# Patient Record
Sex: MALE | Race: WHITE | NOT HISPANIC OR LATINO | Employment: FULL TIME | URBAN - METROPOLITAN AREA
[De-identification: names, ages, dates, MRNs, and addresses within clinical notes are randomized per-mention and may not be internally consistent; named-entity substitution may affect disease eponyms.]

---

## 2018-01-18 ENCOUNTER — OFFICE VISIT (OUTPATIENT)
Dept: FAMILY MEDICINE | Facility: CLINIC | Age: 38
End: 2018-01-18
Payer: COMMERCIAL

## 2018-01-18 VITALS
HEART RATE: 70 BPM | SYSTOLIC BLOOD PRESSURE: 114 MMHG | DIASTOLIC BLOOD PRESSURE: 78 MMHG | WEIGHT: 197 LBS | HEIGHT: 74 IN | OXYGEN SATURATION: 98 % | BODY MASS INDEX: 25.28 KG/M2 | TEMPERATURE: 98.6 F

## 2018-01-18 DIAGNOSIS — J02.9 SORE THROAT: ICD-10-CM

## 2018-01-18 DIAGNOSIS — H65.192 OTHER ACUTE NONSUPPURATIVE OTITIS MEDIA OF LEFT EAR, RECURRENCE NOT SPECIFIED: Primary | ICD-10-CM

## 2018-01-18 DIAGNOSIS — J06.9 VIRAL URI WITH COUGH: ICD-10-CM

## 2018-01-18 LAB
DEPRECATED S PYO AG THROAT QL EIA: NORMAL
SPECIMEN SOURCE: NORMAL

## 2018-01-18 PROCEDURE — 87081 CULTURE SCREEN ONLY: CPT | Performed by: FAMILY MEDICINE

## 2018-01-18 PROCEDURE — 87880 STREP A ASSAY W/OPTIC: CPT | Performed by: FAMILY MEDICINE

## 2018-01-18 PROCEDURE — 99203 OFFICE O/P NEW LOW 30 MIN: CPT | Performed by: FAMILY MEDICINE

## 2018-01-18 RX ORDER — AZITHROMYCIN 250 MG/1
TABLET, FILM COATED ORAL
Qty: 6 TABLET | Refills: 0 | Status: SHIPPED | OUTPATIENT
Start: 2018-01-18 | End: 2018-05-31

## 2018-01-18 NOTE — PROGRESS NOTES
SUBJECTIVE:                                                    Fer Santacruz is a 37 year old male who presents to clinic today for the following health issues: new patient to our clinic. No ac    Acute Illness   Acute illness concerns: sore throat  Onset: 5 days     Fever: no    Chills/Sweats: no    Headache (location?): YES- bilateral forehead area.     Sinus Pressure:YES- slight    Conjunctivitis:  no    Ear Pain: YES: bilateral and feel plugged.     Rhinorrhea: no    Congestion: YES - nasal productive of sputum with yellow-brown mucus.     Sore Throat: YES     Cough: YES-productive of brown yellow sputum- improving , but present throughout the day.     Wheeze: no    Decreased Appetite: no    Nausea: no    Vomiting: no    Diarrhea:  no    Dysuria/Freq.: no    Fatigue/Achiness: no    Sick/Strep Exposure: no    Took advil pm last night and feels a little better today.      Therapies Tried and outcome: aleve this am , advil            Allergies   Allergen Reactions     Penicillins Rash     Health Maintenance   Topic Date Due     LIPID SCREEN Q5 YR MALE (SYSTEM ASSIGNED)  04/03/2017     TETANUS IMMUNIZATION (SYSTEM ASSIGNED)  11/03/2018     INFLUENZA VACCINE (SYSTEM ASSIGNED)  Completed       Patient Active Problem List   Diagnosis     NO ACTIVE PROBLEMS       Past Medical History:   Diagnosis Date     NO ACTIVE PROBLEMS        Past Surgical History:   Procedure Laterality Date     ARTHROSCOPY SHOULDER DISTAL CLAVICLE REPAIR Left     left shoulder - AC joint       wisdom teeth         Social History     Social History     Marital status:      Spouse name: Gracie      Number of children: 1     Years of education: N/A     Occupational History            [employer]      Social History Main Topics     Smoking status: Former Smoker     Quit date: 1/18/2006     Smokeless tobacco: Never Used     Alcohol use Yes      Comment: occassional     Drug use: No     Sexual activity: Yes     Partners: Female  "     Comment:  - Gracie      Other Topics Concern     Not on file     Social History Narrative     No narrative on file       History reviewed. No pertinent family history.  No current outpatient prescriptions on file.        Allergies   Allergen Reactions     Penicillins Rash         Problem list and histories reviewed & adjusted, as indicated.  Additional history: as documented    Reviewed and updated as needed this visit by clinical staffTobacco  Allergies  Meds  Med Hx  Surg Hx  Fam Hx  Soc Hx      Reviewed and updated as needed this visit by Provider         ROS:   ROS: 12 point ROS neg other than the symptoms noted above    OBJECTIVE:                                                    /78 (BP Location: Right arm, Patient Position: Chair, Cuff Size: Adult Large)  Pulse 70  Temp 98.6  F (37  C) (Oral)  Ht 6' 2\" (1.88 m)  Wt 197 lb (89.4 kg)  SpO2 98%  BMI 25.29 kg/m2  Body mass index is 25.29 kg/(m^2).   GENERAL: healthy, alert, well nourished, well hydrated, no distress  HENT: ear canals- normal;left  TM is-red , dull and bulging, the rightTM  is  normal; Nose- congested, sinuses = nontender;  Mouth- no ulcers, no lesions, throat = a bit red posteriorly.   NECK: no tenderness, no adenopathy, no asymmetry, no masses, no stiffness; thyroid- normal to palpation  RESP: lungs clear to auscultation - no rales, no rhonchi, no wheezes  CV: regular rates and rhythm, normal S1 S2, no S3 or S4 and no murmur, no click or rub -  ABDOMEN: soft, no tenderness, no  hepatosplenomegaly, no masses, normal bowel sounds  MS: extremities- no gross deformities noted, no edema.     Diagnostic test results:  Results for orders placed or performed in visit on 01/18/18 (from the past 24 hour(s))   Strep, Rapid Screen   Result Value Ref Range    Specimen Description Throat     Rapid Strep A Screen       NEGATIVE: No Group A streptococcal antigen detected by immunoassay, await culture report.      "     ASSESSMENT/PLAN:                                                        ICD-10-CM    1. Other acute nonsuppurative otitis media of left ear, recurrence not specified H65.192 azithromycin (ZITHROMAX) 250 MG tablet   2. Sore throat J02.9 Strep, Rapid Screen     Beta strep group A culture   3. Viral URI with cough J06.9     B97.89      Please, call or return to clinic or go to the ER immediately if signs or symptoms worsen or fail to improve as anticipated.   See Patient Instructions.           Amee Sidhu MD  [facility]

## 2018-01-18 NOTE — MR AVS SNAPSHOT
After Visit Summary   1/18/2018    Fer Santacruz    MRN: [id number]           Patient Information     Date Of Birth          1980        Visit Information        Provider Department      1/18/2018 2:30 PM Amee Sidhu MD [facility] [place]        Today's Diagnoses     Other acute nonsuppurative otitis media of left ear, recurrence not specified    -  1    Sore throat        Viral URI with cough          Care Instructions                    Middle Ear Infection (Otitis Media)            What is a middle ear infection?   A middle ear infection is an infection of the space in the ear behind the eardrum. Anyone can get an ear infection, but ear infections are more common in children less than 8 years old.   How does it occur?   Ear infections usually begin with a viral infection of the nose and throat. For example, a cold might lead to an infection of the ear. Ear infections may also occur when you have allergies. The viral infection or allergic reaction can cause swelling of the tube between your ear and throat (the eustachian tube). The swelling may trap bacteria in your middle ear, resulting in a bacterial infection.   Pressure from the buildup of pus or fluid in the ear sometimes causes the eardrum to tear (rupture). The eardrum is a thin membrane that separates and protects the delicate parts of the middle ear from the air and moisture in the ear canal.   What are the symptoms?   You may have one or more of these symptoms:   earache   hearing loss   feeling of blockage in the ear   fever   dizziness.   How is it diagnosed?   Your healthcare provider will ask about your symptoms and look at your eardrum.   Your healthcare provider may use a special light to look into the ear canal and check for fluid in the ear. Your provider will look at how the eardrum moves when a puff of air is blown into the ear. If there is fluid behind the eardrum, the membrane will not move well.    How is it treated?   Antibiotic medicine is a common treatment for ear infections. However, recent studies have shown that the symptoms of ear infections often go away in a couple of days without antibiotics. Bacteria can become resistant to antibiotics, and the medicine may cause side effects. For these reasons, your healthcare provider may wait 1 to 3 days to see if the symptoms go away on their own before prescribing an antibiotic.   Your provider may recommend a decongestant (tablets or a nasal spray) to help clear the eustachian tube. This may help relieve pressure in the middle ear. For pain take a nonprescription pain reliever such as acetaminophen (Tylenol) or ibuprofen. Check with your healthcare provider before you give any medicine that contains aspirin or salicylates to a child or teen. This includes medicines like baby aspirin, some cold medicines, and Pepto Bismol. Children and teens who take aspirin are at risk for a serious illness called Reye's syndrome. Aspirin and ibuprofen are nonsteroidal anti-inflammatory medicines (NSAIDs). NSAIDs may cause stomach bleeding and other problems. These risks increase with age. Read the label and take as directed. Unless recommended by your healthcare provider, do not take NSAIDs for more than 10 days for any reason.   How long will the effects last?   In most cases you should feel better in 2 to 3 days.   If you are taking an antibiotic and your eardrum has not returned to normal when your provider examines you again, you may need to take a different antibiotic or other medicine. In this case, it may take another 1 to 2 weeks before your ear feels normal again.   What can I do to take care of myself?   Follow your healthcare provider's instructions.   If you are taking an antibiotic, take all of it according to the directions, even when the symptoms have gone away before you have finished it.   To help relieve pain, put a warm moist washcloth or a hot water  bottle over the ear.   If you have discharge from your ear, you can wipe it away with a washcloth and loosely plug the ear with cotton to catch further drainage. Discharge from the ear can mean that you have an infection of the ear canal or, if you have a lot of fluid and pus draining from your ear, you may have a ruptured eardrum. Ask your healthcare provider how to care for the ear if you have discharge. If the discharge is caused by a ruptured eardrum, then you will need to protect the ear from water. You will need one or more follow-up appointments to check the healing of your eardrum.   If you have a fever:   Rest until your temperature has fallen below 100?F (37.8?C). Then become as active as is comfortable.   Ask your provider if you can take aspirin, acetaminophen, or ibuprofen to control your fever.   Keep a daily record of your temperature.   Call your healthcare provider if you have:   a temperature of 101.5 degrees F (38.6 degrees C) or higher that persists even after you take acetaminophen, aspirin, or ibuprofen   a severe headache or worsening pain around the ear   swelling around the ear   increasing dizziness   worsening of hearing problems   weakness of one side of your face.   Keep all your appointments. Your healthcare provider may want you to have one or more follow-up exams until signs of inflammation and infection have disappeared.   How can I prevent an ear infection from occurring?   If you tend to get ear infections often, ask your healthcare provider if you need to be checked for allergies. Getting treatment for allergies may help prevent ear infections.   Ask if using decongestants when you have a cold may help prevent you from getting ear infections.         Published by [employer].  This content is reviewed periodically and is subject to change as new health information becomes available. The information is intended to inform and educate and is not a replacement for medical evaluation,  advice, diagnosis or treatment by a healthcare professional.   Developed by [employer].   ? 2010 [facility] and/or its affiliates. All Rights Reserved.   Copyright   Clinical Reference Systems 2011               Thank you for choosing [facility]  for your Health Care. It was a pleasure seeing you at your visit today. Please contact us with any questions or concerns you may have.                   Amee Sidhu MD                                  To reach your [facility] care team after hours call:   [phone]    Our clinic hours are:     Monday- 7:30 am - 7:00 pm                             Tuesday through Friday- 7:30 am - 5:00 pm                                        Saturday- 8:00 am - 12:00 pm                  Phone:  [phone]    Our pharmacy hours are:     Monday  8:00 am to 7:00 pm      Tuesday through Friday 8:00am to 6:00pm                        Saturday - 9:00 am to 1:00 pm      Sunday : Closed.              Phone:  [phone]      There is also information available at our web site:  [email or web address]    If your provider ordered any lab tests and you do not receive the results within 10 business days, please call the clinic.    If you need a medication refill please contact your pharmacy.  Please allow 2 business days for your refill to be completed.    Our clinic offers telephone visits and e visits.  Please ask one of your team members to explain more.      Use [employer] (secure email communication and access to your chart) to send your primary care provider a message or make an appointment. Ask someone on your Team how to sign up for [employer].                       Follow-ups after your visit        Who to contact     If you have questions or need follow up information about today's clinic visit or your schedule please contact [facility] directly at [phone].  Normal or non-critical lab and imaging results will be communicated  "to you by MyChart, letter or phone within 4 business days after the clinic has received the results. If you do not hear from us within 7 days, please contact the clinic through LiveAction or phone. If you have a critical or abnormal lab result, we will notify you by phone as soon as possible.  Submit refill requests through LiveAction or call your pharmacy and they will forward the refill request to us. Please allow 3 business days for your refill to be completed.          Additional Information About Your Visit        LiveAction Information     LiveAction lets you send messages to your doctor, view your test results, renew your prescriptions, schedule appointments and more. To sign up, go to [email or web address] . Click on \"Log in\" on the left side of the screen, which will take you to the Welcome page. Then click on \"Sign up Now\" on the right side of the page.     You will be asked to enter the access code listed below, as well as some personal information. Please follow the directions to create your username and password.     Your access code is: [email or web address]  Expires: 2018  3:23 PM     Your access code will  in 90 days. If you need help or a new code, please call your [place] clinic or [phone].        Care EveryWhere ID     This is your Care EveryWhere ID. This could be used by other organizations to access your [place] medical records  [id number]        Your Vitals Were     Pulse Temperature Height Pulse Oximetry BMI (Body Mass Index)       70 98.6  F (37  C) (Oral) 6' 2\" (1.88 m) 98% 25.29 kg/m2        Blood Pressure from Last 3 Encounters:   18 114/78    Weight from Last 3 Encounters:   18 197 lb (89.4 kg)              We Performed the Following     Beta strep group A culture     Strep, Rapid Screen          Today's Medication Changes          These changes are accurate as of: 18  3:23 PM.  If you have any questions, ask your nurse or doctor.               Start taking these " medicines.        Dose/Directions    azithromycin 250 MG tablet   Commonly known as:  ZITHROMAX   Used for:  Other acute nonsuppurative otitis media of left ear, recurrence not specified   Started by:  Amee Sidhu MD        2 tabs first day, then 1 tab by mouth daily for 4 additional days   Quantity:  6 tablet   Refills:  0            Where to get your medicines      These medications were sent to [facility]/pharmacy #[id number] - [place], [place] - [address]  [address] [place] [address]     Phone:  [phone]     azithromycin 250 MG tablet                Primary Care Provider    None Specified       No primary provider on file.        Equal Access to Services     [facility]: Hadii ileana Rushing, wajose g cano, qaybta kaalmada terrance, lisa hurd . So [facility] [phone].    ATENCIÓN: Si habla español, tiene a cannon disposición servicios gratuitos de asistencia lingüística. Ll[facility] [phone].    We comply with applicable federal civil rights laws and [place] laws. We do not discriminate on the basis of race, color, national origin, age, disability, sex, sexual orientation, or gender identity.            Thank you!     Thank you for choosing [facility]  for your care. Our goal is always to provide you with excellent care. Hearing back from our patients is one way we can continue to improve our services. Please take a few minutes to complete the written survey that you may receive in the mail after your visit with us. Thank you!             Your Updated Medication List - Protect others around you: Learn how to safely use, store and throw away your medicines at www.disposemymeds.org.          This list is accurate as of: 1/18/18  3:23 PM.  Always use your most recent med list.                   Brand Name Dispense Instructions for use Diagnosis    azithromycin 250 MG tablet    ZITHROMAX    6 tablet    2 tabs first day, then 1 tab by  mouth daily for 4 additional days    Other acute nonsuppurative otitis media of left ear, recurrence not specified

## 2018-01-18 NOTE — PATIENT INSTRUCTIONS
Middle Ear Infection (Otitis Media)            What is a middle ear infection?   A middle ear infection is an infection of the space in the ear behind the eardrum. Anyone can get an ear infection, but ear infections are more common in children less than 8 years old.   How does it occur?   Ear infections usually begin with a viral infection of the nose and throat. For example, a cold might lead to an infection of the ear. Ear infections may also occur when you have allergies. The viral infection or allergic reaction can cause swelling of the tube between your ear and throat (the eustachian tube). The swelling may trap bacteria in your middle ear, resulting in a bacterial infection.   Pressure from the buildup of pus or fluid in the ear sometimes causes the eardrum to tear (rupture). The eardrum is a thin membrane that separates and protects the delicate parts of the middle ear from the air and moisture in the ear canal.   What are the symptoms?   You may have one or more of these symptoms:   earache   hearing loss   feeling of blockage in the ear   fever   dizziness.   How is it diagnosed?   Your healthcare provider will ask about your symptoms and look at your eardrum.   Your healthcare provider may use a special light to look into the ear canal and check for fluid in the ear. Your provider will look at how the eardrum moves when a puff of air is blown into the ear. If there is fluid behind the eardrum, the membrane will not move well.   How is it treated?   Antibiotic medicine is a common treatment for ear infections. However, recent studies have shown that the symptoms of ear infections often go away in a couple of days without antibiotics. Bacteria can become resistant to antibiotics, and the medicine may cause side effects. For these reasons, your healthcare provider may wait 1 to 3 days to see if the symptoms go away on their own before prescribing an antibiotic.   Your provider may recommend a  decongestant (tablets or a nasal spray) to help clear the eustachian tube. This may help relieve pressure in the middle ear. For pain take a nonprescription pain reliever such as acetaminophen (Tylenol) or ibuprofen. Check with your healthcare provider before you give any medicine that contains aspirin or salicylates to a child or teen. This includes medicines like baby aspirin, some cold medicines, and Pepto Bismol. Children and teens who take aspirin are at risk for a serious illness called Reye's syndrome. Aspirin and ibuprofen are nonsteroidal anti-inflammatory medicines (NSAIDs). NSAIDs may cause stomach bleeding and other problems. These risks increase with age. Read the label and take as directed. Unless recommended by your healthcare provider, do not take NSAIDs for more than 10 days for any reason.   How long will the effects last?   In most cases you should feel better in 2 to 3 days.   If you are taking an antibiotic and your eardrum has not returned to normal when your provider examines you again, you may need to take a different antibiotic or other medicine. In this case, it may take another 1 to 2 weeks before your ear feels normal again.   What can I do to take care of myself?   Follow your healthcare provider's instructions.   If you are taking an antibiotic, take all of it according to the directions, even when the symptoms have gone away before you have finished it.   To help relieve pain, put a warm moist washcloth or a hot water bottle over the ear.   If you have discharge from your ear, you can wipe it away with a washcloth and loosely plug the ear with cotton to catch further drainage. Discharge from the ear can mean that you have an infection of the ear canal or, if you have a lot of fluid and pus draining from your ear, you may have a ruptured eardrum. Ask your healthcare provider how to care for the ear if you have discharge. If the discharge is caused by a ruptured eardrum, then you will  need to protect the ear from water. You will need one or more follow-up appointments to check the healing of your eardrum.   If you have a fever:   Rest until your temperature has fallen below 100?F (37.8?C). Then become as active as is comfortable.   Ask your provider if you can take aspirin, acetaminophen, or ibuprofen to control your fever.   Keep a daily record of your temperature.   Call your healthcare provider if you have:   a temperature of 101.5 degrees F (38.6 degrees C) or higher that persists even after you take acetaminophen, aspirin, or ibuprofen   a severe headache or worsening pain around the ear   swelling around the ear   increasing dizziness   worsening of hearing problems   weakness of one side of your face.   Keep all your appointments. Your healthcare provider may want you to have one or more follow-up exams until signs of inflammation and infection have disappeared.   How can I prevent an ear infection from occurring?   If you tend to get ear infections often, ask your healthcare provider if you need to be checked for allergies. Getting treatment for allergies may help prevent ear infections.   Ask if using decongestants when you have a cold may help prevent you from getting ear infections.         Published by [employer].  This content is reviewed periodically and is subject to change as new health information becomes available. The information is intended to inform and educate and is not a replacement for medical evaluation, advice, diagnosis or treatment by a healthcare professional.   Developed by [employer].   ? 2010 [employer] and/or its affiliates. All Rights Reserved.   Copyright   Clinical Reference Systems 2011               Thank you for choosing [facility]  for your Health Care. It was a pleasure seeing you at your visit today. Please contact us with any questions or concerns you may have.                   Amee Sidhu MD                                   To reach your [facility] - [facility] team after hours call:   [phone]    Our clinic hours are:     Monday- 7:30 am - 7:00 pm                             Tuesday through Friday- 7:30 am - 5:00 pm                                        Saturday- 8:00 am - 12:00 pm                  Phone:  [phone]    Our pharmacy hours are:     Monday  8:00 am to 7:00 pm      Tuesday through Friday 8:00am to 6:00pm                        Saturday - 9:00 am to 1:00 pm      Sunday : Closed.              Phone:  [phone]      There is also information available at our web site:  [email or web address]    If your provider ordered any lab tests and you do not receive the results within 10 business days, please call the clinic.    If you need a medication refill please contact your pharmacy.  Please allow 2 business days for your refill to be completed.    Our clinic offers telephone visits and e visits.  Please ask one of your team members to explain more.      Use [employer] System[employer]art (secure email communication and access to your chart) to send your primary care provider a message or make an appointment. Ask someone on your Team how to sign up for [employer].

## 2018-01-18 NOTE — NURSING NOTE
"Chief Complaint   Patient presents with     Pharyngitis     Otalgia     Initial /78 (BP Location: Right arm, Patient Position: Chair, Cuff Size: Adult Large)  Pulse 70  Temp 98.6  F (37  C) (Oral)  Ht 6' 2\" (1.88 m)  Wt 197 lb (89.4 kg)  SpO2 98%  BMI 25.29 kg/m2 Estimated body mass index is 25.29 kg/(m^2) as calculated from the following:    Height as of this encounter: 6' 2\" (1.88 m).    Weight as of this encounter: 197 lb (89.4 kg).  BP completed using cuff size large right Arm  Aye Auakanksha CMA    "

## 2018-01-19 LAB
BACTERIA SPEC CULT: NORMAL
SPECIMEN SOURCE: NORMAL

## 2018-05-31 ENCOUNTER — OFFICE VISIT (OUTPATIENT)
Dept: FAMILY MEDICINE | Facility: CLINIC | Age: 38
End: 2018-05-31
Payer: COMMERCIAL

## 2018-05-31 VITALS
HEART RATE: 68 BPM | SYSTOLIC BLOOD PRESSURE: 122 MMHG | OXYGEN SATURATION: 98 % | BODY MASS INDEX: 24.78 KG/M2 | TEMPERATURE: 97.4 F | WEIGHT: 193.13 LBS | DIASTOLIC BLOOD PRESSURE: 78 MMHG | HEIGHT: 74 IN

## 2018-05-31 DIAGNOSIS — H69.93 DYSFUNCTION OF BOTH EUSTACHIAN TUBES: Primary | ICD-10-CM

## 2018-05-31 PROCEDURE — 99213 OFFICE O/P EST LOW 20 MIN: CPT | Performed by: PHYSICIAN ASSISTANT

## 2018-05-31 RX ORDER — FLUTICASONE PROPIONATE 50 MCG
2 SPRAY, SUSPENSION (ML) NASAL DAILY
Qty: 1 BOTTLE | Refills: 1 | Status: SHIPPED | OUTPATIENT
Start: 2018-05-31 | End: 2020-06-21

## 2018-05-31 RX ORDER — METHYLPREDNISOLONE 4 MG
TABLET, DOSE PACK ORAL
Qty: 21 TABLET | Refills: 0 | Status: SHIPPED | OUTPATIENT
Start: 2018-05-31 | End: 2019-04-12

## 2018-05-31 RX ORDER — PSEUDOEPHEDRINE HCL 120 MG/1
120 TABLET, FILM COATED, EXTENDED RELEASE ORAL EVERY 12 HOURS
Qty: 28 TABLET | Refills: 0 | COMMUNITY
Start: 2018-05-31 | End: 2018-06-05

## 2018-05-31 NOTE — MR AVS SNAPSHOT
After Visit Summary   5/31/2018    Fer Santacruz    MRN: [id number]           Patient Information     Date Of Birth          1980        Visit Information        Provider Department      5/31/2018 11:40 AM Meghna Pope PA-C [facility] [place]        Today's Diagnoses     Dysfunction of both eustachian tubes    -  1      Care Instructions      Earache, No Infection (Adult)  Earaches can happen without an infection. This occurs when air and fluid build up behind the eardrum causing a feeling of fullness and discomfort and reduced hearing. This is called otitis media with effusion (OME) or serous otitis media. It means there is fluid in the middle ear. It is not the same as acute otitis media, which is typically from infection.  OME can happen when you have a cold if congestion blocks the passage that drains the middle ear. This passage is called the eustachian tube. OME may also occur with nasal allergies or after a bacterial middle ear infection.    The pain or discomfort may come and go. You may hear clicking or popping sounds when you chew or swallow. You may feel that your balance is off. Or you may hear ringing in the ear.  It often takes from several weeks up to 3 months for the fluid to clear on its own. Oral pain relievers and ear drops help if there is pain. Decongestants and antihistamines sometimes help. Antibiotics don't help since there is no infection. Your doctor may prescribe a nasal spray to help reduce swelling in the nose and eustachian tube. This can allow the ear to drain.  If your OME doesn't improve after 3 months, surgery may be used to drain the fluid and insert a small tube in the eardrum to allow continued drainage.  Because the middle ear fluid can become infected, it is important to watch for signs of an ear infection which may develop later. These signs include increased ear pain, fever, or drainage from the ear.  Home care  The following guidelines  will help you care for yourself at home:    You may use over-the-counter medicine as directed to control pain, unless another medicine was prescribed. If you have chronic liver or kidney disease or ever had a stomach ulcer or GI bleeding, talk with your doctor before using these medicines. Aspirin should never be used in anyone under 18 years of age who is ill with a fever. It may cause severe liver damage.    You may use over-the-counter decongestants such as phenylephrine or pseudoephedrine. But they are not always helpful. Don't use nasal spray decongestants more than 3 days. Longer use can make congestion worse. Prescription nasal sprays from your doctor don't typically have those restrictions.    Antihistamines may help if you are also having allergy symptoms.    You may use medicines such as guaifenesin to thin mucus and promote drainage.  Follow-up care  Follow up with your healthcare provider or as advised if you are not feeling better after 3 days.  When to seek medical advice  Call your healthcare provider right away if any of the following occur:    Your ear pain gets worse or does not start to improve     Fever of 100.4 F (38 C) or higher, or as directed by your healthcare provider    Fluid or blood draining from the ear    Headache or sinus pain    Stiff neck    Unusual drowsiness or confusion  Date Last Reviewed: 10/1/2016    8906-1583 [employer]. [address] [place] [address]. All rights reserved. This information is not intended as a substitute for professional medical care. Always follow your healthcare professional's instructions.                Follow-ups after your visit        Follow-up notes from your care team     Return for Wellness Visit with fasting labs.      Who to contact     If you have questions or need follow up information about today's clinic visit or your schedule please contact [facility] directly at [phone].  Normal or non-critical  "lab and imaging results will be communicated to you by MyChart, letter or phone within 4 business days after the clinic has received the results. If you do not hear from us within 7 days, please contact the clinic through [employer] or phone. If you have a critical or abnormal lab result, we will notify you by phone as soon as possible.  Submit refill requests through [employer] or call your pharmacy and they will forward the refill request to us. Please allow 3 business days for your refill to be completed.          Additional Information About Your Visit        [employer] Information     [employer] lets you send messages to your doctor, view your test results, renew your prescriptions, schedule appointments and more. To sign up, go to [email or web address] . Click on \"Log in\" on the left side of the screen, which will take you to the Welcome page. Then click on \"Sign up Now\" on the right side of the page.     You will be asked to enter the access code listed below, as well as some personal information. Please follow the directions to create your username and password.     Your access code is: [email or web address]  Expires: 2018 12:03 PM     Your access code will  in 90 days. If you need help or a new code, please call your [place] clinic or [phone].        Care EveryWhere ID     This is your Care EveryWhere ID. This could be used by other organizations to access your [place] medical records  [id number]        Your Vitals Were     Pulse Temperature Height Pulse Oximetry BMI (Body Mass Index)       68 97.4  F (36.3  C) (Tympanic) 6' 2\" (1.88 m) 98% 24.8 kg/m2        Blood Pressure from Last 3 Encounters:   18 122/78   18 114/78    Weight from Last 3 Encounters:   18 193 lb 2 oz (87.6 kg)   18 197 lb (89.4 kg)              Today, you had the following     No orders found for display         Today's Medication Changes          These changes are accurate as of 18 12:03 PM.  If you have " any questions, ask your nurse or doctor.               Start taking these medicines.        Dose/Directions    fluticasone 50 MCG/ACT spray   Commonly known as:  FLONASE   Used for:  Dysfunction of both eustachian tubes   Started by:  Meghna Pope PA-C        Dose:  2 spray   Spray 2 sprays into both nostrils daily   Quantity:  1 Bottle   Refills:  1       methylPREDNISolone 4 MG tablet   Commonly known as:  MEDROL DOSEPAK   Used for:  Dysfunction of both eustachian tubes   Started by:  Meghna Pope PA-C        Follow package instructions   Quantity:  21 tablet   Refills:  0       pseudoePHEDrine 120 MG 12 hr tablet   Commonly known as:  SUDAFED   Used for:  Dysfunction of both eustachian tubes   Started by:  Meghna Pope PA-C        Dose:  120 mg   Take 1 tablet (120 mg) by mouth every 12 hours for 5 days   Quantity:  28 tablet   Refills:  0            Where to get your medicines      These medications were sent to [address] [address]     Phone:  [phone]     fluticasone 50 MCG/ACT spray    methylPREDNISolone 4 MG tablet         Some of these will need a paper prescription and others can be bought over the counter.  Ask your nurse if you have questions.     You don't need a prescription for these medications     pseudoePHEDrine 120 MG 12 hr tablet                Primary Care Provider Office Phone # Fax #    [facility] [phone]       [address] [place], [place] [id number]        Equal Access to Services     NANO FUENTES AH: Hadii ileana thomas hadasho Somarleniali, waaxda luqadaha, qaybta kaalmada adeegyada, lisa lynne. So [facility] [phone].    ATENCIÓN: Si habla español, tiene a cannon disposición servicios gratuitos de asistencia lingüística. Llame al [phone].    We comply with applicable federal civil rights laws and [place]  laws. We do not discriminate on the basis of race, color, national origin, age, disability, sex, sexual orientation, or gender identity.            Thank you!     Thank you for choosing [facility]  for your care. Our goal is always to provide you with excellent care. Hearing back from our patients is one way we can continue to improve our services. Please take a few minutes to complete the written survey that you may receive in the mail after your visit with us. Thank you!             Your Updated Medication List - Protect others around you: Learn how to safely use, store and throw away your medicines at www.disposemymeds.org.          This list is accurate as of 5/31/18 12:03 PM.  Always use your most recent med list.                   Brand Name Dispense Instructions for use Diagnosis    fluticasone 50 MCG/ACT spray    FLONASE    1 Bottle    Spray 2 sprays into both nostrils daily    Dysfunction of both eustachian tubes       methylPREDNISolone 4 MG tablet    MEDROL DOSEPAK    21 tablet    Follow package instructions    Dysfunction of both eustachian tubes       pseudoePHEDrine 120 MG 12 hr tablet    SUDAFED    28 tablet    Take 1 tablet (120 mg) by mouth every 12 hours for 5 days    Dysfunction of both eustachian tubes

## 2018-05-31 NOTE — PROGRESS NOTES
"  SUBJECTIVE:   Fer Santacruz is a 38 year old male who presents to clinic today for the following health issues:    Ear Pain   The patient presents to the clinic for evaluation of throbbing right ear pain that has been present for the past two days. He also has a mild sore throat, congestion, rhinorrhea. He has tried taking an antihistamine and benadryl. He denies fever, drainage from the ear, tobacco use, and sneezing. He was treated with azithromycin for a left ear infection in 1/2018.      Problem list and histories reviewed & adjusted, as indicated.  Additional history: as documented      Reviewed and updated as needed this visit by clinical staff  Tobacco  Allergies  Meds  Problems  Med Hx  Surg Hx  Fam Hx  Soc Hx        Reviewed and updated as needed this visit by Provider  Tobacco  Allergies  Meds  Problems  Med Hx  Surg Hx  Fam Hx  Soc Hx          ROS:  Constitutional, HEENT, cardiovascular, pulmonary, GI, , musculoskeletal, neuro, skin, endocrine and psych systems are negative, except as otherwise noted.    This document serves as a record of the services and decisions personally performed and made by Meghna Pope PA-C. It was created on his behalf by Jessika Humphries, a trained medical scribe. The creation of this document is based on the provider's statements to the medical scribe.  Jessika Humphries 11:59 AM 5/31/2018  OBJECTIVE:   /78  Pulse 68  Temp 97.4  F (36.3  C) (Tympanic)  Ht 6' 2\" (1.88 m)  Wt 193 lb 2 oz (87.6 kg)  SpO2 98%  BMI 24.8 kg/m2  Body mass index is 24.8 kg/(m^2).  GENERAL: healthy, alert and no distress  EYES: Eyes grossly normal to inspection, PERRL and conjunctivae and sclerae normal  HENT: clear tense effusion bilaterally, otherwise ear canals and TM's normal, nose and mouth without ulcers or lesions  NECK: no adenopathy, no asymmetry, masses, or scars and thyroid normal to palpation  RESP: lungs clear to auscultation - no rales, rhonchi or " wheezes  CV: regular rate and rhythm, normal S1 S2, no S3 or S4, no murmur, click or rub, no peripheral edema and peripheral pulses strong    Diagnostic Test Results:  none   ASSESSMENT/PLAN:   Fer was seen today for ear problem.    Diagnoses and all orders for this visit:    Dysfunction of both eustachian tubes  Start taking Medrol dosepak and OTC sudafed 12 hrs. Start using Flonase one daily for two weeks, reviewed the accumulative nature of the nasal spray. Discussed the risk for infection for untreated eustachian tube dysfunction. If symptoms worsen, followup with recheck.  -     fluticasone (FLONASE) 50 MCG/ACT spray; Spray 2 sprays into both nostrils daily  -     methylPREDNISolone (MEDROL DOSEPAK) 4 MG tablet; Follow package instructions  -     pseudoePHEDrine (SUDAFED) 120 MG 12 hr tablet; Take 1 tablet (120 mg) by mouth every 12 hours for 5 days      Return for Wellness Visit with fasting labs.    The information in this document, created by a scribe for me, accurately reflects the services I personally performed and the decisions made by me. I have reviewed and approved this document for accuracy.    Meghna Pope PA-C  [facility]

## 2018-05-31 NOTE — PATIENT INSTRUCTIONS
Earache, No Infection (Adult)  Earaches can happen without an infection. This occurs when air and fluid build up behind the eardrum causing a feeling of fullness and discomfort and reduced hearing. This is called otitis media with effusion (OME) or serous otitis media. It means there is fluid in the middle ear. It is not the same as acute otitis media, which is typically from infection.  OME can happen when you have a cold if congestion blocks the passage that drains the middle ear. This passage is called the eustachian tube. OME may also occur with nasal allergies or after a bacterial middle ear infection.    The pain or discomfort may come and go. You may hear clicking or popping sounds when you chew or swallow. You may feel that your balance is off. Or you may hear ringing in the ear.  It often takes from several weeks up to 3 months for the fluid to clear on its own. Oral pain relievers and ear drops help if there is pain. Decongestants and antihistamines sometimes help. Antibiotics don't help since there is no infection. Your doctor may prescribe a nasal spray to help reduce swelling in the nose and eustachian tube. This can allow the ear to drain.  If your OME doesn't improve after 3 months, surgery may be used to drain the fluid and insert a small tube in the eardrum to allow continued drainage.  Because the middle ear fluid can become infected, it is important to watch for signs of an ear infection which may develop later. These signs include increased ear pain, fever, or drainage from the ear.  Home care  The following guidelines will help you care for yourself at home:    You may use over-the-counter medicine as directed to control pain, unless another medicine was prescribed. If you have chronic liver or kidney disease or ever had a stomach ulcer or GI bleeding, talk with your doctor before using these medicines. Aspirin should never be used in anyone under 18 years of age who is ill with a fever. It  may cause severe liver damage.    You may use over-the-counter decongestants such as phenylephrine or pseudoephedrine. But they are not always helpful. Don't use nasal spray decongestants more than 3 days. Longer use can make congestion worse. Prescription nasal sprays from your doctor don't typically have those restrictions.    Antihistamines may help if you are also having allergy symptoms.    You may use medicines such as guaifenesin to thin mucus and promote drainage.  Follow-up care  Follow up with your healthcare provider or as advised if you are not feeling better after 3 days.  When to seek medical advice  Call your healthcare provider right away if any of the following occur:    Your ear pain gets worse or does not start to improve     Fever of 100.4 F (38 C) or higher, or as directed by your healthcare provider    Fluid or blood draining from the ear    Headache or sinus pain    Stiff neck    Unusual drowsiness or confusion  Date Last Reviewed: 10/1/2016    7491-2014 [employer]. [address] [place] [address], [place] [address]. All rights reserved. This information is not intended as a substitute for professional medical care. Always follow your healthcare professional's instructions.

## 2019-04-12 ENCOUNTER — OFFICE VISIT (OUTPATIENT)
Dept: FAMILY MEDICINE | Facility: CLINIC | Age: 39
End: 2019-04-12
Payer: COMMERCIAL

## 2019-04-12 VITALS
BODY MASS INDEX: 25.28 KG/M2 | SYSTOLIC BLOOD PRESSURE: 128 MMHG | DIASTOLIC BLOOD PRESSURE: 77 MMHG | OXYGEN SATURATION: 97 % | HEIGHT: 74 IN | HEART RATE: 91 BPM | WEIGHT: 197 LBS | TEMPERATURE: 98.6 F

## 2019-04-12 DIAGNOSIS — H92.02 OTALGIA OF LEFT EAR: Primary | ICD-10-CM

## 2019-04-12 DIAGNOSIS — Z23 NEED FOR PROPHYLACTIC VACCINATION WITH TETANUS-DIPHTHERIA (TD): ICD-10-CM

## 2019-04-12 PROCEDURE — 99213 OFFICE O/P EST LOW 20 MIN: CPT | Mod: 25 | Performed by: FAMILY MEDICINE

## 2019-04-12 PROCEDURE — 90714 TD VACC NO PRESV 7 YRS+ IM: CPT | Performed by: FAMILY MEDICINE

## 2019-04-12 PROCEDURE — 90471 IMMUNIZATION ADMIN: CPT | Performed by: FAMILY MEDICINE

## 2019-04-12 ASSESSMENT — MIFFLIN-ST. JEOR: SCORE: 1888.34

## 2019-04-12 NOTE — PROGRESS NOTES
"  SUBJECTIVE:                                                    Fer Santacruz is a 39 year old male who presents to clinic today for the following health issues:    Acute Illness   Acute illness concerns: left  Ear pain - initially a UPPER RESPIRATORY INFECTION for   Onset: x 1 month    Fever: no    Chills/Sweats: no    Headache (location?): no    Sinus Pressure:no    Conjunctivitis:  no    Ear Pain: YES- pain    Rhinorrhea: no    Congestion: no    Sore Throat: no     Cough: no    Wheeze: no    Decreased Appetite: no    Nausea: no    Vomiting: no    Diarrhea:  no    Dysuria/Freq.: no    Fatigue/Achiness: no    Sick/Strep Exposure: no     Therapies Tried and outcome: nothing    Problem list and histories reviewed & adjusted, as indicated.  Additional history: as documented    ROS:   Constitutional, HEENT, cardiovascular, pulmonary, GI, , musculoskeletal, neuro, skin, endocrine and psych systems are negative, except as otherwise noted.    OBJECTIVE:                     /77   Pulse 91   Temp 98.6  F (37  C) (Oral)   Ht 1.88 m (6' 2\")   Wt 89.4 kg (197 lb)   SpO2 97%   BMI 25.29 kg/m    GENERAL: no apparent distress  EYES: Conjunctiva are not injected, no discharge.  EARS: Left TM -no erythema, no effusion,  not bulged.               Right TM -no erythema, no effusion,  not bulged.  NOSE: no discharge, no sinus tenderness  THROAT: no erythema, no exudate, no lesions  NECK: supple, no adenopathy.  CARDIAC: regular rate and rhythm, no murmur  RESP: clear, no wheezing, no rales, no rhonchi  ABD: soft, no distension, no tenderness  SKIN: No rashes    ASSESSMENT/PLAN:                       Fer was seen today for otalgia.    Diagnoses and all orders for this visit:    Otalgia of left ear - ? Mild ETD  No signs of infection. Nsaids prn  -          ADMIN VACCINE, FIRST    Need for prophylactic vaccination with tetanus-diphtheria (Td)  -     TD (ADULT, 7+) PRESERVE FREE        Symptomatic cares and fever " control(if indicated) discussed.  Risks and benefits of meds discussed.    See patient instruction.        Tai Mcknight MD

## 2019-04-25 ENCOUNTER — OFFICE VISIT (OUTPATIENT)
Dept: URGENT CARE | Facility: URGENT CARE | Age: 39
End: 2019-04-25
Payer: COMMERCIAL

## 2019-04-25 VITALS
BODY MASS INDEX: 25.29 KG/M2 | WEIGHT: 197 LBS | HEART RATE: 67 BPM | SYSTOLIC BLOOD PRESSURE: 124 MMHG | DIASTOLIC BLOOD PRESSURE: 76 MMHG | TEMPERATURE: 99 F | OXYGEN SATURATION: 98 %

## 2019-04-25 DIAGNOSIS — S61.012A LACERATION OF LEFT THUMB WITHOUT FOREIGN BODY WITHOUT DAMAGE TO NAIL, INITIAL ENCOUNTER: Primary | ICD-10-CM

## 2019-04-25 PROCEDURE — 12001 RPR S/N/AX/GEN/TRNK 2.5CM/<: CPT | Performed by: FAMILY MEDICINE

## 2019-04-25 NOTE — PATIENT INSTRUCTIONS
Patient Education     Extremity Laceration: Skin Glue  A laceration is a cut through the skin. You have a laceration that has been closed with skin glue. This is used on cuts that have smooth edges and are not infected. It's best used on straight, clean cuts on areas that do not get a lot of tension.  You may need a tetanus shot. This is given if you have no record of a shot, and the object that caused the cut may lead to tetanus.  Home care    Your healthcare provider may prescribe an antibiotic. This is to help prevent infection. Follow all instructions for taking this medicine. Take the medicine every day until it is gone or you are told to stop. You should not have any left over.    The healthcare provider may prescribe medicines for pain. Follow instructions for taking them.    Follow the healthcare provider s instructions on how to care for the cut.    No bandage is needed. Skin glue peels off on its own within 5 to 10 days. Most skin wounds heal within 10 days.    Keep the wound clean. You may shower or bathe as usual, but do not use soaps, lotions, or ointments on the wound area. Do not scrub the wound. After bathing, pat the wound dry with a soft towel.    Don't scratch, rub, or pick at the film. Don't place tape directly over the film.    Don't put liquids such as peroxide, ointments, or creams on the wound while the skin glue is in place. Many oil based products can weaken and dissolve the glue.    Don't do any activities that may reinjure your wound.    Don't do any activities that cause heavy sweating. Protect the wound from sunlight.    Most skin wounds heal without problems. But an infection sometimes occurs even with proper treatment. Watch for the signs of infection listed below.  Follow-up care  Follow up as directed with your healthcare provider, or as advised.  When to seek medical advice  Call your healthcare provider right away if any of these occur:    Wound bleeding not controlled by direct  pressure    Signs of infection, including increasing pain in the wound, increasing wound redness or swelling, or pus or bad odor coming from the wound    Fever of 100.4 F (38. C) or higher, or as directed by your healthcare provider    Wound edges reopen    Wound changes colors    Numbness around the wound     Decreased movement around the injured area  Date Last Reviewed: 7/1/2017 2000-2018 [employer]. [address] [place] [address]. All rights reserved. This information is not intended as a substitute for professional medical care. Always follow your healthcare professional's instructions.

## 2019-04-25 NOTE — PROGRESS NOTES
SUBJECTIVE:     Chief Complaint   Patient presents with     Urgent Care     Laceration     Left thumb cut with knife this afternoon whjonny Santacruz is a 39 year old male who presents to the clinic with a laceration on the left finger thumb sustained 1 hour(s) ago.  This is a non-work related and accidental injury.    Mechanism of injury: knife.    Associated symptoms: Denies numbness, weakness, or loss of function  Last tetanus booster within 10 years: yes    EXAM:   The patient appears today in alert,no apparent distress distress  VITALS: /76   Pulse 67   Temp 99  F (37.2  C) (Oral)   Wt 89.4 kg (197 lb)   SpO2 98%   BMI 25.29 kg/m      Size of laceration: 1 centimeters located in the medial aspect of the thumb near the nail extending into the anterior aspect of the thumb over the distal phalanx  Characteristics of the laceration: bleeding- mild and flap type  Tendon function intact: yes  Sensation to light touch intact: yes  Pulses intact: not applicable  Picture included in patient's chart: no    Assessment:  Laceration of left thumb without foreign body without damage to nail, initial encounter    PLAN:  PROCEDURE NOTE::    Wound cleaned with HIBICLENS  Dermabond was applied  After care instructions:  Keep wound clean and dry for the next 24-48 hours  Signs of infection discussed today  May return to work as long as wound is kept clean and dry  He is supposed to go for a golf leak tonight I encouraged patient not to go for it as it might cause disturbance to the laceration area and cause bleeding      Loretta Zaragoza MD

## 2019-09-30 ENCOUNTER — NURSE TRIAGE (OUTPATIENT)
Dept: NURSING | Facility: CLINIC | Age: 39
End: 2019-09-30

## 2019-10-01 ENCOUNTER — OFFICE VISIT (OUTPATIENT)
Dept: URGENT CARE | Facility: URGENT CARE | Age: 39
End: 2019-10-01
Payer: COMMERCIAL

## 2019-10-01 VITALS
TEMPERATURE: 98.9 F | OXYGEN SATURATION: 99 % | DIASTOLIC BLOOD PRESSURE: 79 MMHG | SYSTOLIC BLOOD PRESSURE: 110 MMHG | HEART RATE: 61 BPM

## 2019-10-01 DIAGNOSIS — K42.9 UMBILICAL HERNIA WITHOUT OBSTRUCTION AND WITHOUT GANGRENE: Primary | ICD-10-CM

## 2019-10-01 PROCEDURE — 99213 OFFICE O/P EST LOW 20 MIN: CPT | Performed by: PHYSICIAN ASSISTANT

## 2019-10-01 NOTE — PROGRESS NOTES
SUBJECTIVE:   Fer Santacruz is a 39 year old male presenting with a chief complaint of   Chief Complaint   Patient presents with     Urgent Care     Mass     Possible abdominal hernia x2 weeks- constant pain specially when trying lifting things. Had it for years but was not bothering him       He is an established patient of [facility].    Patient is presenting to urgent care today with a complaint of possible abdominal hernia.  Notes he has had a mass at the umbilicus for the past 10 years or so. He thinks it may have gotten slightly bigger over the past few years.  In the past couple weeks however he has been having intermittent pain with heavy lifting. Also notes some pain at rest.  He denies any nausea or vomiting.  No fevers or chills.  No previous abdominal surgeries. No urinary symptoms. No recent trauma or injury.      Review of Systems   Constitutional: Negative for chills and fever.   Gastrointestinal: Negative for diarrhea, nausea and vomiting.   Genitourinary: Negative for dysuria, frequency and hematuria.   Musculoskeletal: Negative for back pain.       Past Medical History:   Diagnosis Date     NO ACTIVE PROBLEMS      History reviewed. No pertinent family history.  Current Outpatient Medications   Medication Sig Dispense Refill     fluticasone (FLONASE) 50 MCG/ACT spray Spray 2 sprays into both nostrils daily (Patient not taking: Reported on 2019) 1 Bottle 1     Social History     Tobacco Use     Smoking status: Former Smoker     Last attempt to quit: 2006     Years since quittin.7     Smokeless tobacco: Never Used   Substance Use Topics     Alcohol use: Yes     Comment: occassional       OBJECTIVE  /79 (BP Location: Right arm, Patient Position: Chair, Cuff Size: Adult Large)   Pulse 61   Temp 98.9  F (37.2  C) (Oral)   SpO2 99%     Physical Exam  Vitals signs and nursing note reviewed.   Constitutional:       General: He is not in acute distress.     Appearance: He is  well-developed.   HENT:      Head: Normocephalic and atraumatic.      Right Ear: External ear normal.      Left Ear: External ear normal.   Eyes:      Conjunctiva/sclera: Conjunctivae normal.   Neck:      Musculoskeletal: Normal range of motion.   Cardiovascular:      Rate and Rhythm: Regular rhythm.      Heart sounds: Normal heart sounds.   Pulmonary:      Effort: Pulmonary effort is normal. No respiratory distress.      Breath sounds: Normal breath sounds.   Abdominal:      General: Bowel sounds are normal.      Palpations: Abdomen is soft.      Hernia: A hernia (umbilical hernia noted) is present.   Skin:     General: Skin is warm and dry.   Neurological:      Mental Status: He is alert.         Labs:  No results found for this or any previous visit (from the past 24 hour(s)).        ASSESSMENT:      ICD-10-CM    1. Umbilical hernia without obstruction and without gangrene K42.9 GENERAL SURG ADULT REFERRAL          PLAN:    Umbilical hernia: general surgery referral to discuss treatment options.  Discussed red flag symptoms and when to seek emergent care.  Follow up if any worsening symptoms.  Patient agrees with the plan    Followup:    If not improving or if condition worsens, follow up with your Primary Care Provider

## 2019-10-02 ASSESSMENT — ENCOUNTER SYMPTOMS
FEVER: 0
HEMATURIA: 0
DIARRHEA: 0
DYSURIA: 0
FREQUENCY: 0
NAUSEA: 0
CHILLS: 0
VOMITING: 0
BACK PAIN: 0

## 2019-10-03 ENCOUNTER — TELEPHONE (OUTPATIENT)
Dept: SURGERY | Facility: CLINIC | Age: 39
End: 2019-10-03

## 2019-10-03 ENCOUNTER — OFFICE VISIT (OUTPATIENT)
Dept: SURGERY | Facility: CLINIC | Age: 39
End: 2019-10-03
Payer: COMMERCIAL

## 2019-10-03 VITALS
BODY MASS INDEX: 25.28 KG/M2 | HEART RATE: 82 BPM | DIASTOLIC BLOOD PRESSURE: 74 MMHG | HEIGHT: 74 IN | SYSTOLIC BLOOD PRESSURE: 106 MMHG | WEIGHT: 197 LBS

## 2019-10-03 DIAGNOSIS — K42.9 UMBILICAL HERNIA WITHOUT OBSTRUCTION AND WITHOUT GANGRENE: Primary | ICD-10-CM

## 2019-10-03 PROCEDURE — 99204 OFFICE O/P NEW MOD 45 MIN: CPT | Performed by: SURGERY

## 2019-10-03 ASSESSMENT — MIFFLIN-ST. JEOR: SCORE: 1888.34

## 2019-10-03 NOTE — TELEPHONE ENCOUNTER
Type of surgery: Umbilical hernia repair with mesh  Location of surgery: [facility]  Date and time of surgery: 10/15/19 at 8:30am  Surgeon: Dr. Sam Don  Pre-Op Appt Date: Patient to schedule  Post-Op Appt Date: Patient to schedule   Packet sent out: Yes  Pre-cert/Authorization completed:  Not Applicable  Date: 10/3/19

## 2019-10-05 NOTE — PROGRESS NOTES
"[facility]  Surgery Consultation    CONSULTATION REQUESTED BY:  CitlaliGretchen Kellie [phone]    HPI: Patient is a 39-year-old gentleman referred by the above-mentioned provider for consultation regarding umbilical hernia.  He reports that this hernia has been present for approximately 10 years.  It has recently gotten somewhat larger and more symptomatic.  He said no signs or symptoms of incarceration or strangulation.  No GI symptoms.    PMH:   has a past medical history of NO ACTIVE PROBLEMS.  PSH:    has a past surgical history that includes Arthroscopy shoulder distal clavicle repair (Left) and wisdom teeth.  Social History:   reports that he quit smoking about 13 years ago. He has never used smokeless tobacco. He reports current alcohol use. He reports that he does not use drugs.  Family History:  family history is not on file.  Medications/Allergies: Home medications and allergies reviewed.    ROS:  The 10 point Review of Systems is negative other than noted in the HPI.    Physical Exam:  /74   Pulse 82   Ht 1.88 m (6' 2\")   Wt 89.4 kg (197 lb)   BMI 25.29 kg/m    GENERAL: Generally appears well.  Psych: Alert and Oriented.  Normal affect  Eyes: Sclera clear  Respiratory:  Lungs clear to ausculation bilaterally with good air excursion  Cardiovascular:  Regular Rate and Rhythm with no murmurs gallops or rubs, normal peripheral pulses  GI: Abdomen Non Distended Mild tenderness to palpation periumbilical Umbilical hernia palpated..  Groin- I examined the patient in both the standing and supine positions. Right Groin- No hernia Palpated. Left Groin- No hernia Palpated. No scrotal or testicle abnormalities.  Lymphatic/Hematologic/Immune:  No femoral or cervical lymphadenopathy.  Integumentary:  No rashes  Neurological: grossly intact     All new lab and imaging data was reviewed.     Impression and Plan:  Patient is a 39 year old male with umbilical hernia    PLAN: Recommend " outpatient open repair at his convenience  I discussed the pathophysiology of hernias and options for repair including laparoscopic VS open.  The risks associated with the procedure including, but not limited to, recurrence, nerve entrapment or injury, persistence of pain, injury to the bowel/bladder, infertility, hematoma, mesh migration, mesh infection, MI, and PE were discussed with the patient. He indicated understanding of the discussion, asked appropriate questions, and provided consent. Signs and symptoms of incarceration were discussed. If these develop in the interim, he promises to call or go straight to the ER. I have provided the patient with an information pamphlet.    Thank you very much for this consult.    Sam Don M.D.  [facility]  [phone]    Please route or send letter to:  Primary Care Provider (PCP) and Referring Provider

## 2019-10-07 ENCOUNTER — OFFICE VISIT (OUTPATIENT)
Dept: FAMILY MEDICINE | Facility: CLINIC | Age: 39
End: 2019-10-07
Payer: COMMERCIAL

## 2019-10-07 VITALS
WEIGHT: 199 LBS | DIASTOLIC BLOOD PRESSURE: 72 MMHG | SYSTOLIC BLOOD PRESSURE: 138 MMHG | BODY MASS INDEX: 25.54 KG/M2 | HEIGHT: 74 IN | TEMPERATURE: 99.8 F | HEART RATE: 80 BPM | OXYGEN SATURATION: 97 %

## 2019-10-07 DIAGNOSIS — J06.9 VIRAL URI WITH COUGH: Primary | ICD-10-CM

## 2019-10-07 PROCEDURE — 99213 OFFICE O/P EST LOW 20 MIN: CPT | Performed by: PHYSICIAN ASSISTANT

## 2019-10-07 ASSESSMENT — MIFFLIN-ST. JEOR: SCORE: 1897.41

## 2019-10-07 NOTE — PROGRESS NOTES
Subjective     Fer Santarcuz is a 39 year old male who presents to clinic today for the following health issues:    HPI   Acute Illness   Acute illness concerns: cough  Onset: started mid-last week; Wed/Thursday.   Planning to have a pre-op on Friday for hernia repair so figured he should get checked out before then. Due with their second child so hoping to follow through with this has wife is needing his help.  Non-smoker.  Hx of asthma during childhood, but hasn't seem to affect him as an adult.  No hx of pneumonia.       Fever: hasn't checked his temp    Chills/Sweats: no    Headache (location?): YES - last couple of days, but today seems better.     Sinus Pressure:no    Conjunctivitis:  no    Ear Pain: no    Rhinorrhea: YES    Congestion: YES    Sore Throat: no     Cough: YES, productive cough ranging in color from green to brown.    Wheeze: no    Decreased Appetite: YES    Nausea: YES, last week    Vomiting: no    Diarrhea:  no    Dysuria/Freq.: no    Fatigue/Achiness: YES, tired but not achy    Sick/Strep Exposure: none known     Therapies Tried and outcome: has taken dayquil and Nyquil a couple of times - and it helped a little. Wears off as day goes on.      Patient Active Problem List   Diagnosis     NO ACTIVE PROBLEMS     Past Surgical History:   Procedure Laterality Date     ARTHROSCOPY SHOULDER DISTAL CLAVICLE REPAIR Left     left shoulder - AC joint       wisdom teeth         Social History     Tobacco Use     Smoking status: Former Smoker     Last attempt to quit: 2006     Years since quittin.7     Smokeless tobacco: Never Used   Substance Use Topics     Alcohol use: Yes     Comment: occassional     History reviewed. No pertinent family history.      Current Outpatient Medications   Medication Sig Dispense Refill     fluticasone (FLONASE) 50 MCG/ACT spray Spray 2 sprays into both nostrils daily 1 Bottle 1     Allergies   Allergen Reactions     Penicillins Rash         Reviewed and  "updated as needed this visit by Provider  Tobacco  Allergies  Meds  Problems  Med Hx  Surg Hx  Fam Hx  Soc Hx          Review of Systems   ROS COMP: Constitutional, HEENT, cardiovascular, pulmonary, gi and gu systems are negative, except as otherwise noted.        Objective    /72 (BP Location: Right arm, Cuff Size: Adult Regular)   Pulse 80   Temp 99.8  F (37.7  C) (Oral)   Ht 1.88 m (6' 2\")   Wt 90.3 kg (199 lb)   SpO2 97%   BMI 25.55 kg/m    Body mass index is 25.55 kg/m .  Physical Exam   GENERAL: healthy, alert and no distress  EYES: Eyes grossly normal to inspection, PERRL and conjunctivae and sclerae normal  HENT: normal cephalic/atraumatic, ear canals and TM's normal, nose and mouth without ulcers or lesions, oropharynx clear and oral mucous membranes moist. Pharynx non-erythematous. No tonsillar exudates.  NECK: no adenopathy and no asymmetry, masses, or scars  RESP: lungs clear to auscultation - no rales, rhonchi or wheezes  CV: regular rates and rhythm and no murmur, click or rub    Diagnostic Test Results:  Labs reviewed in Epic        Assessment & Plan       ICD-10-CM    1. Viral URI with cough J06.9     B97.89    See Patient Instructions  Patient in agreement with plan.   If worsening or concerns may need to consider re-scheduling his surgery coming up, but hopefully he'll be continuing to improve before then.     Patient Instructions   I'm sorry you're not feeling well.  Symptoms and exam findings are most consistent with a viral process.  Treatment is supportive.  Re-check anytime with failure to improve as expected or with new concerns.      Return in about 4 days (around 10/11/2019) for for pre-op.    Flor Bernard PA-C  [facility]        "

## 2019-10-07 NOTE — PATIENT INSTRUCTIONS
I'm sorry you're not feeling well.  Symptoms and exam findings are most consistent with a viral process.  Treatment is supportive.  Re-check anytime with failure to improve as expected or with new concerns.

## 2019-10-11 ENCOUNTER — OFFICE VISIT (OUTPATIENT)
Dept: FAMILY MEDICINE | Facility: CLINIC | Age: 39
End: 2019-10-11
Payer: COMMERCIAL

## 2019-10-11 VITALS
OXYGEN SATURATION: 97 % | HEART RATE: 95 BPM | BODY MASS INDEX: 25.67 KG/M2 | SYSTOLIC BLOOD PRESSURE: 112 MMHG | HEIGHT: 74 IN | TEMPERATURE: 98.5 F | WEIGHT: 200 LBS | DIASTOLIC BLOOD PRESSURE: 72 MMHG

## 2019-10-11 DIAGNOSIS — Z13.6 CARDIOVASCULAR SCREENING; LDL GOAL LESS THAN 160: ICD-10-CM

## 2019-10-11 DIAGNOSIS — K42.9 UMBILICAL HERNIA WITHOUT OBSTRUCTION AND WITHOUT GANGRENE: ICD-10-CM

## 2019-10-11 DIAGNOSIS — Z01.818 PREOP GENERAL PHYSICAL EXAM: Primary | ICD-10-CM

## 2019-10-11 DIAGNOSIS — Z23 NEED FOR PROPHYLACTIC VACCINATION AND INOCULATION AGAINST INFLUENZA: ICD-10-CM

## 2019-10-11 LAB
ANION GAP SERPL CALCULATED.3IONS-SCNC: 4 MMOL/L (ref 3–14)
BUN SERPL-MCNC: 16 MG/DL (ref 7–30)
CALCIUM SERPL-MCNC: 9.2 MG/DL (ref 8.5–10.1)
CHLORIDE SERPL-SCNC: 104 MMOL/L (ref 94–109)
CHOLEST SERPL-MCNC: 163 MG/DL
CO2 SERPL-SCNC: 30 MMOL/L (ref 20–32)
CREAT SERPL-MCNC: 1 MG/DL (ref 0.66–1.25)
GFR SERPL CREATININE-BSD FRML MDRD: >90 ML/MIN/{1.73_M2}
GLUCOSE SERPL-MCNC: 93 MG/DL (ref 70–99)
HDLC SERPL-MCNC: 36 MG/DL
HGB BLD-MCNC: 15.1 G/DL (ref 13.3–17.7)
LDLC SERPL CALC-MCNC: 89 MG/DL
NONHDLC SERPL-MCNC: 127 MG/DL
POTASSIUM SERPL-SCNC: 4.4 MMOL/L (ref 3.4–5.3)
SODIUM SERPL-SCNC: 138 MMOL/L (ref 133–144)
TRIGL SERPL-MCNC: 191 MG/DL

## 2019-10-11 PROCEDURE — 99214 OFFICE O/P EST MOD 30 MIN: CPT | Mod: 25 | Performed by: PHYSICIAN ASSISTANT

## 2019-10-11 PROCEDURE — 36415 COLL VENOUS BLD VENIPUNCTURE: CPT | Performed by: PHYSICIAN ASSISTANT

## 2019-10-11 PROCEDURE — 80061 LIPID PANEL: CPT | Performed by: PHYSICIAN ASSISTANT

## 2019-10-11 PROCEDURE — 85018 HEMOGLOBIN: CPT | Performed by: PHYSICIAN ASSISTANT

## 2019-10-11 PROCEDURE — 90471 IMMUNIZATION ADMIN: CPT | Performed by: PHYSICIAN ASSISTANT

## 2019-10-11 PROCEDURE — 90686 IIV4 VACC NO PRSV 0.5 ML IM: CPT | Performed by: PHYSICIAN ASSISTANT

## 2019-10-11 PROCEDURE — 80048 BASIC METABOLIC PNL TOTAL CA: CPT | Performed by: PHYSICIAN ASSISTANT

## 2019-10-11 ASSESSMENT — MIFFLIN-ST. JEOR: SCORE: 1901.94

## 2019-10-11 NOTE — LETTER
October 14, 2019      Fer Santacruz  [address] [place] [address]        Dear ,    We are writing to inform you of your test results.    HDL(good) cholesterol level is low and your triglycerides are elevated which can increase your heart disease risk.  A diet high in fat and simple carbohydrates, genetics and being overweight can contribute to this. LDL(bad) cholesterol level is normal.  ADVISE:exercising 150 minutes of aerobic exercise per week (30 minutes 5 days per week or 50 minutes 3 days per week are options), and omega-3 fatty acids (fish oil) 8876-4749 mg daily are helpful to improve this.    -Kidney function is normal (Cr, GFR), Sodium is normal, Potassium is normal, Calcium is normal, Glucose is normal.   -Hemoglobin level was normal.     You may proceed with surgery as planned.    For additional lab test information, [email or web address] is an excellent reference.  Please contact the clinic at [phone] with any further questions or concerns.    Resulted Orders   Basic metabolic panel  (Ca, Cl, CO2, Creat, Gluc, K, Na, BUN)   Result Value Ref Range    Sodium 138 133 - 144 mmol/L    Potassium 4.4 3.4 - 5.3 mmol/L    Chloride 104 94 - 109 mmol/L    Carbon Dioxide 30 20 - 32 mmol/L    Anion Gap 4 3 - 14 mmol/L    Glucose 93 70 - 99 mg/dL      Comment:      Non Fasting    Urea Nitrogen 16 7 - 30 mg/dL    Creatinine 1.00 0.66 - 1.25 mg/dL    GFR Estimate >90 >60 mL/min/[1.73_m2]      Comment:      Non  GFR Calc  Starting 12/18/2018, serum creatinine based estimated GFR (eGFR) will be   calculated using the Chronic Kidney Disease Epidemiology Collaboration   (CKD-EPI) equation.      GFR Estimate If Black >90 >60 mL/min/[1.73_m2]      Comment:       GFR Calc  Starting 12/18/2018, serum creatinine based estimated GFR (eGFR) will be   calculated using the Chronic Kidney Disease Epidemiology Collaboration   (CKD-EPI) equation.      Calcium 9.2 8.5  - 10.1 mg/dL   Lipid panel reflex to direct LDL Fasting   Result Value Ref Range    Cholesterol 163 <200 mg/dL    Triglycerides 191 (H) <150 mg/dL      Comment:      Borderline high:  150-199 mg/dl  High:             200-499 mg/dl  Very high:       >499 mg/dl  Non Fasting      HDL Cholesterol 36 (L) >39 mg/dL    LDL Cholesterol Calculated 89 <100 mg/dL      Comment:      Desirable:       <100 mg/dl    Non HDL Cholesterol 127 <130 mg/dL   Hemoglobin   Result Value Ref Range    Hemoglobin 15.1 13.3 - 17.7 g/dL       If you have any questions or concerns, please call the clinic at the number listed above.       Sincerely,        Flor Bernard PA-C

## 2019-10-11 NOTE — PROGRESS NOTES
[facility]  [address] [facility] [id number] [phone]  Dept: [phone]    PRE-OP EVALUATION:  Today's date: 10/11/2019    Fer Santacruz (: 1980) presents for pre-operative evaluation assessment as requested by Dr. Don .  He requires evaluation and anesthesia risk assessment prior to undergoing surgery/procedure for treatment of umbilical hernia.    Proposed Surgery/ Procedure: Umbilical Hernia repair  Date of Surgery/ Procedure: 10/15/2019  Time of Surgery/ Procedure: 630 am  Hospital/Surgical Facility: [facility]  Fax number for surgical facility: [phone]  Primary Physician: Gretchen Godwin  Type of Anesthesia Anticipated: General    Patient has a Health Care Directive or Living Will:  NO    1. NO - Do you have a history of heart attack, stroke, stent, bypass or surgery on an artery in the head, neck, heart or legs?  2. NO - Do you ever have any pain or discomfort in your chest?  3. NO - Do you have a history of  Heart Failure?  4. NO - Are you troubled by shortness of breath when: walking on the level, up a slight hill or at night?  5. YES - Do you currently have a cold, bronchitis or other respiratory infection? Cold - resolving and feeling much better.   6. YES - Do you have a cough, shortness of breath or wheezing? Cough from viral cold - much better from previous.  7. NO - Do you sometimes get pains in the calves of your legs when you walk?  8. NO - Do you or anyone in your family have previous history of blood clots?  9. NO - Do you or does anyone in your family have a serious bleeding problem such as prolonged bleeding following surgeries or cuts?  10. NO - Have you ever had problems with anemia or been told to take iron pills?  11. NO - Have you had any abnormal blood loss such as black, tarry or bloody stools, or abnormal vaginal bleeding?  12. NO - Have you ever had a blood transfusion?  13. NO - Have you or any of your relatives  ever had problems with anesthesia?  14. NO - Do you have sleep apnea, excessive snoring or daytime drowsiness?  15. NO - Do you have any prosthetic heart valves?  16. NO - Do you have prosthetic joints?  17. NO - Is there any chance that you may be pregnant?      HPI:     HPI related to upcoming procedure: Thien is a 38 yo male here today for pre-op. Has had a hernia for past 10 yrs and didn't even realize it until his wife pointed it out to him. Over the past 2 yrs ago started to bother him a bit more so sought gen surg consult. Now pursing surgical repair.     States he has been feeling much better from his viral illness we saw him for earlier for this week. Would like to have his flu vaccine while here.  Non-smoker.      See problem list for active medical problems.  Problems all longstanding and stable, except as noted/documented.  See ROS for pertinent symptoms related to these conditions.      MEDICAL HISTORY:     Patient Active Problem List    Diagnosis Date Noted     CARDIOVASCULAR SCREENING; LDL GOAL LESS THAN 160 10/11/2019     Priority: Medium     Umbilical hernia 2014     Priority: Medium      Past Medical History:   Diagnosis Date     NO ACTIVE PROBLEMS      Past Surgical History:   Procedure Laterality Date     ARTHROSCOPY SHOULDER DISTAL CLAVICLE REPAIR Left     left shoulder - AC joint       wisdom teeth       Current Outpatient Medications   Medication Sig Dispense Refill     fluticasone (FLONASE) 50 MCG/ACT spray Spray 2 sprays into both nostrils daily 1 Bottle 1     OTC products: Nyquil - reviewed instructions for discontinuing    Allergies   Allergen Reactions     Penicillins Rash      Latex Allergy: NO    Social History     Tobacco Use     Smoking status: Former Smoker     Last attempt to quit: 2006     Years since quittin.7     Smokeless tobacco: Never Used   Substance Use Topics     Alcohol use: Yes     Comment: occassional     History   Drug Use No       REVIEW OF SYSTEMS:  "  CONSTITUTIONAL: NEGATIVE for fever, chills, change in weight  INTEGUMENTARY/SKIN: NEGATIVE for worrisome rashes, moles or lesions  EYES: NEGATIVE for vision changes or irritation  ENT/MOUTH: NEGATIVE for ear, mouth and throat problems  RESP: NEGATIVE for significant cough or SOB  CV: NEGATIVE for chest pain, palpitations or peripheral edema  GI: + for umbilical hernia. NEGATIVE for nausea, abdominal pain, heartburn, or change in bowel habits  : NEGATIVE for frequency, dysuria, or hematuria  MUSCULOSKELETAL: NEGATIVE for significant arthralgias or myalgia  NEURO: NEGATIVE for weakness, dizziness or paresthesias  ENDOCRINE: NEGATIVE for temperature intolerance, skin/hair changes  HEME: NEGATIVE for bleeding problems  PSYCHIATRIC: NEGATIVE for changes in mood or affect    EXAM:   /72 (BP Location: Right arm, Cuff Size: Adult Large)   Pulse 95   Temp 98.5  F (36.9  C) (Oral)   Ht 1.88 m (6' 2\")   Wt 90.7 kg (200 lb)   SpO2 97%   BMI 25.68 kg/m      GENERAL APPEARANCE: healthy, alert and no distress     EYES: EOMI,  PERRL     HENT: ear canals and TM's normal and nose and mouth without ulcers or lesions     NECK: no adenopathy, no asymmetry, masses, or scars and thyroid normal to palpation     RESP: lungs clear to auscultation - no rales, rhonchi or wheezes     CV: regular rates and rhythm, normal S1 S2, no S3 or S4 and no murmur, click or rub     ABDOMEN:  soft, nontender, no HSM or masses and bowel sounds normal     MS: extremities normal- no gross deformities noted, no evidence of inflammation in joints, FROM in all extremities.     SKIN: no suspicious lesions or rashes     NEURO: Normal strength and tone, sensory exam grossly normal, mentation intact and speech normal     PSYCH: mentation appears normal. and affect normal/bright     LYMPHATICS: No cervical adenopathy    DIAGNOSTICS:   EKG: Not indicated due to non-vascular surgery and low risk of event (age <65 and without cardiac risk " factors)    Results for orders placed or performed in visit on 10/11/19   Basic metabolic panel  (Ca, Cl, CO2, Creat, Gluc, K, Na, BUN)   Result Value Ref Range    Sodium 138 133 - 144 mmol/L    Potassium 4.4 3.4 - 5.3 mmol/L    Chloride 104 94 - 109 mmol/L    Carbon Dioxide 30 20 - 32 mmol/L    Anion Gap 4 3 - 14 mmol/L    Glucose 93 70 - 99 mg/dL    Urea Nitrogen 16 7 - 30 mg/dL    Creatinine 1.00 0.66 - 1.25 mg/dL    GFR Estimate >90 >60 mL/min/[1.73_m2]    GFR Estimate If Black >90 >60 mL/min/[1.73_m2]    Calcium 9.2 8.5 - 10.1 mg/dL   Lipid panel reflex to direct LDL Fasting   Result Value Ref Range    Cholesterol 163 <200 mg/dL    Triglycerides 191 (H) <150 mg/dL    HDL Cholesterol 36 (L) >39 mg/dL    LDL Cholesterol Calculated 89 <100 mg/dL    Non HDL Cholesterol 127 <130 mg/dL   Hemoglobin   Result Value Ref Range    Hemoglobin 15.1 13.3 - 17.7 g/dL       IMPRESSION:   Reason for surgery/procedure: umbilical hernia  Diagnosis/reason for consult: pre-op    The proposed surgical procedure is considered INTERMEDIATE risk.    REVISED CARDIAC RISK INDEX  The patient has the following serious cardiovascular risks for perioperative complications such as (MI, PE, VFib and 3  AV Block):  No serious cardiac risks  INTERPRETATION: 0 risks: Class I (very low risk - 0.4% complication rate)    The patient has the following additional risks for perioperative complications:  No identified additional risks      ICD-10-CM    1. Preop general physical exam Z01.818 Basic metabolic panel  (Ca, Cl, CO2, Creat, Gluc, K, Na, BUN)     Hemoglobin   2. CARDIOVASCULAR SCREENING; LDL GOAL LESS THAN 160 Z13.6 Lipid panel reflex to direct LDL Fasting   3. Umbilical hernia without obstruction and without gangrene K42.9    4. Need for prophylactic vaccination and inoculation against influenza Z23 INFLUENZA VACCINE IM > 6 MONTHS VALENT IIV4 [52294]     Vaccine Administration, Initial [87500]       RECOMMENDATIONS:     APPROVAL GIVEN to  proceed with proposed procedure, without further diagnostic evaluation.       Signed Electronically by: Flor Bernard PA-C    Copy of this evaluation report is provided to requesting physician.    [place] Preop Guidelines    Revised Cardiac Risk Index

## 2019-10-13 NOTE — RESULT ENCOUNTER NOTE
Please call or write patient with the following results:    Dear Thien,    Your recent lab results are noted below:    -HDL(good) cholesterol level is low and your triglycerides are elevated which can increase your heart disease risk.  A diet high in fat and simple carbohydrates, genetics and being overweight can contribute to this. LDL(bad) cholesterol level is normal.  ADVISE:exercising 150 minutes of aerobic exercise per week (30 minutes 5 days per week or 50 minutes 3 days per week are options), and omega-3 fatty acids (fish oil) 3273-4668 mg daily are helpful to improve this.    -Kidney function is normal (Cr, GFR), Sodium is normal, Potassium is normal, Calcium is normal, Glucose is normal.   -Hemoglobin level was normal.     You may proceed with surgery as planned.    For additional lab test information, [email or web address] is an excellent reference.  Please contact the clinic at [phone] with any further questions or concerns.      Thank you,      Flor Bernard PA-C  [facility]

## 2019-10-15 ENCOUNTER — OFFICE VISIT (OUTPATIENT)
Dept: SURGERY | Facility: PHYSICIAN GROUP | Age: 39
End: 2019-10-15
Payer: COMMERCIAL

## 2019-10-15 ENCOUNTER — TRANSFERRED RECORDS (OUTPATIENT)
Dept: HEALTH INFORMATION MANAGEMENT | Facility: CLINIC | Age: 39
End: 2019-10-15

## 2019-10-15 DIAGNOSIS — Z09 FOLLOW-UP EXAMINATION FOLLOWING SURGERY: Primary | ICD-10-CM

## 2019-10-15 DIAGNOSIS — Z53.9 ERRONEOUS ENCOUNTER--DISREGARD: ICD-10-CM

## 2019-10-15 DIAGNOSIS — G89.18 POSTOPERATIVE PAIN: ICD-10-CM

## 2019-10-15 PROCEDURE — 49585 ZZHC REPAIR UMBILICAL HERN,5+Y/O,REDUC: CPT | Mod: AS | Performed by: PHYSICIAN ASSISTANT

## 2019-10-15 PROCEDURE — 49585 ZZHC REPAIR UMBILICAL HERN,5+Y/O,REDUC: CPT | Performed by: SURGERY

## 2019-10-16 ENCOUNTER — TELEPHONE (OUTPATIENT)
Dept: SURGERY | Facility: CLINIC | Age: 39
End: 2019-10-16

## 2019-10-16 DIAGNOSIS — G89.18 POSTOPERATIVE PAIN: Primary | ICD-10-CM

## 2019-10-16 DIAGNOSIS — G89.18 ACUTE POST-OPERATIVE PAIN: Primary | ICD-10-CM

## 2019-10-16 RX ORDER — OXYCODONE HYDROCHLORIDE 5 MG/1
5-10 TABLET ORAL
Status: DISCONTINUED | OUTPATIENT
Start: 2019-10-16 | End: 2019-10-16 | Stop reason: CLARIF

## 2019-10-16 RX ORDER — OXYCODONE HYDROCHLORIDE 5 MG/1
5-10 TABLET ORAL EVERY 4 HOURS PRN
Qty: 15 TABLET | Refills: 0 | Status: SHIPPED | OUTPATIENT
Start: 2019-10-16 | End: 2019-10-19

## 2019-10-16 NOTE — TELEPHONE ENCOUNTER
Reports he is having a lot of pain after discharge from umbilical hernia.    He is taking Norco 5/325 mg 2 tabs every 6 hours.  States pain barely improves at all (5% improvement) when he takes it and any minimal relief is gone within an hour.    Pain is now 7-8/10 now surrounding umbilicus. Due for more Norco now.     Last night took 200 mg ibuprofen last night two different times and used ice but pain didn't respond.      No liver/kidney problems.      Consulted SM[facility] Mazariegos.    Plan of care:  -Stop Norco, change to oxycodone 5-10 mg every 3 hours for breakthrough pain  -scheduled tylenol 1000 mg every 8 hours  -scheduled ibuprofen 600 mg every 6 hours  -continue with ice    Also suggested pt start a stool softener per bottle directions to avoid constipation.     Called pt back. Reviewed care plan as above.  Pt agreeable to plan, states he wrote all the directions down.    Billie Smith RN on 10/16/2019 at 3:10 PM

## 2019-10-16 NOTE — TELEPHONE ENCOUNTER
Name of caller: Patient    Reason for Call:  Medication question, doesn't seem effective for pain    Surgeon:  Dr. Don     Recent Surgery:  Yes.    If yes, when & what type:  Umbilical hernia, 10/14/19      Best phone number to reach pt at is: [phone]  Ok to leave a message with medical info? Yes.    Pharmacy preferred (if calling for a refill):

## 2019-10-29 ENCOUNTER — TELEPHONE (OUTPATIENT)
Dept: SURGERY | Facility: CLINIC | Age: 39
End: 2019-10-29

## 2019-10-29 NOTE — TELEPHONE ENCOUNTER
[facility]  Post op call note     Fer Santacruz was called for an update regarding his recovery.  He underwent an Umbilical Hernia Repair by Dr. Don on October / 15 / 2019.  Today he tells me he is doing well and denies any complaints.  He is eating a normal diet and his bowels are regular.  He states his wounds are healing well and denies any erythema or drainage at his wounds.     He was instructed to gradually resume all normal activities. The patient states all of his questions were answered and he agrees to follow up in clinic as needed.    Yony Villasenor PA-C        Please route or send letter to:  Primary Care Provider (PCP)

## 2020-06-21 ENCOUNTER — OFFICE VISIT (OUTPATIENT)
Dept: URGENT CARE | Facility: URGENT CARE | Age: 40
End: 2020-06-21
Payer: COMMERCIAL

## 2020-06-21 VITALS — DIASTOLIC BLOOD PRESSURE: 88 MMHG | TEMPERATURE: 97.6 F | SYSTOLIC BLOOD PRESSURE: 130 MMHG | HEART RATE: 95 BPM

## 2020-06-21 DIAGNOSIS — S09.90XA HEAD INJURY, INITIAL ENCOUNTER: ICD-10-CM

## 2020-06-21 DIAGNOSIS — S01.81XA FACIAL LACERATION, INITIAL ENCOUNTER: Primary | ICD-10-CM

## 2020-06-21 PROCEDURE — 99213 OFFICE O/P EST LOW 20 MIN: CPT | Mod: 25 | Performed by: FAMILY MEDICINE

## 2020-06-21 PROCEDURE — 12013 RPR F/E/E/N/L/M 2.6-5.0 CM: CPT | Performed by: FAMILY MEDICINE

## 2020-06-21 NOTE — PROGRESS NOTES
SUBJECTIVE: @[email or web address] who presents to the clinic with a laceration on the forehead sustained early this am, tripped and fell, was drinking but no loc and mild ha ago.    This is a non-work related and accidental injury.    Mechanism of injury: blunt trauma (contusion).  Associated symptoms: Denies numbness, weakness, or loss of function  Last tetanus booster within 10 years: yes    Past Medical History:   Diagnosis Date     NO ACTIVE PROBLEMS        Past Surgical History:   Procedure Laterality Date     ARTHROSCOPY SHOULDER DISTAL CLAVICLE REPAIR Left     left shoulder - AC joint       wisdom teeth         No family history on file.    Social History     Tobacco Use     Smoking status: Former Smoker     Last attempt to quit: 2006     Years since quittin.4     Smokeless tobacco: Never Used   Substance Use Topics     Alcohol use: Yes     Comment: occassional        Allergies   Allergen Reactions     Penicillins Rash       ROS:  Neuro: good distal sensation  Motor: normal rom and strenght  Hem: capillary refill < 2 sec    EXAM: The patient appears today in alert,no apparent distress distressVITALS:   /88   Pulse 95   Temp 97.6  F (36.4  C) (Tympanic)   Size of laceration: 3 centimeters  Characteristics of the laceration: clean and straight  Tendon function intact: yes  Sensation to light touch intact: yes  Pulses/Capillary refill intact: yes  PERRLA eomi  Non focal neuro exam      ICD-10-CM    1. Facial laceration, initial encounter  S01.81XA REPAIR SUPERFICIAL, WOUND FACE/EAR 2.6-5.0 CM   2. Head injury, initial encounter  S09.90XA        Procedure Note:Wound injected with 2 cc's of Lidocaine 1% with epinephrine  Good anesthesia was obtainedPrepped and draped in the usual sterile fashion  Wound cleaned with sterile water  Wound irrigatedLaceration was closed using 5 6-0 nylon interrupted sutures  After care instructions:Keep wound clean   Sutures out in 5 days  Signs of infection discussed  today

## 2020-06-21 NOTE — PATIENT INSTRUCTIONS
Patient Education     First Aid: Head Injuries  A strong blow to the head may cause swelling and bleeding inside the skull. The resulting pressure can injure the brain (concussion). If you have any doubts about a concussion, have a healthcare provider check the victim.  When to call 911  Call 911 right away if any of the following is true:    The victim loses consciousness or is lethargic.    The victim has convulsions or seizures.    The victim has unequal pupil size. The pupil is the black part in the center of the eye.    The victim shows any of the following signs of concussion:  ? Confusion or inability to follow normal conversation  ? Slurred speech  ? Dizziness or vision problems  ? Nausea or vomiting  ? Muscle weakness or loss of mobility  ? Memory loss  ? Sensitivity to noise    A depressed or spongy area in the skull, or visible bone fragments    Clear fluid draining out of  the ears or nose    Bruising behind the ears or around the eyes  While you wait for help:    Reassure the person.    Treat for shock by maintaining body temperature and keeping the victim calm.    Do rescue breathing or CPR, if needed.  If the person has neck or back pain or is unconscious, he or she might have a spine fracture. Move the person only with great caution and only if absolutely needed.   Step 1. Control bleeding    Apply direct pressure to control bleeding. Wear gloves or use other protection to avoid contact with victim's blood.    Wash a minor surface injury with soap and water after the bleeding stops or is reduced.    Cover the wound with a clean dressing and bandage.  Step 2. Ice bumps and bruises    Place a cold pack or ice on the injury to reduce swelling and pain. Placing a cloth between the skin and the ice pack helps prevent tissue damage from severe cold.  Step 3. Observe the victim    Watch for vomiting or changes in mood or alertness. If you notice changes, call for medical help. Signs of concussion may not  appear for up to 48 hours.    Tell the person's partner, parent, or roommate about the injury so he or she can continue to observe the victim.  Stitches  If a cut is deep or continues to bleed, or the edges of skin don't stay together evenly, the wound may need to be closed with stitches, tape, staples, or medical glue. Any of these can help speed healing and reduce the risk for infection and the size of the scar. These may be especially important concerns with large wounds, and wounds on the head or other visible body parts.  If you think a wound may need medical care, see a healthcare provider as soon as possible. If you need stitches, they must be done in the first few hours. A wound that is not properly closed is at risk for serious infection.  Date Last Reviewed: 12/1/2017 2000-2019 [employer]. [address] [place] [address]. All rights reserved. This information is not intended as a substitute for professional medical care. Always follow your healthcare professional's instructions.           Patient Education     Face Laceration: Stitches or Tape  A laceration is a cut through the skin. This will require stitches if it is deep. Minor cuts may be treated with surgical tape.    Home care    Your healthcare provider may prescribe an antibiotic. This is to help prevent infection. Follow all instructions for taking this medicine. Take the medicine every day until it is gone or you are told to stop. You should not have any left over.    The healthcare provider may prescribe medicines for pain. Follow instructions for taking them.    Follow the healthcare provider s instructions on how to care for the cut.    Wash your hands with soap and warm water before and after caring for the cut. This helps prevent infection.    If a bandage was applied and it becomes wet or dirty, replace it. Otherwise, leave it in place for the first 24 hours, then change it once a day or as directed.    If  stitches were used, clean the wound daily:  ? After removing the bandage, wash the area with soap and water. Use a wet cotton swab to loosen and remove any blood or crust that forms.  ? After cleaning, keep the wound clean and dry. Talk with your healthcare provider before putting any antibiotic ointment on the wound. Reapply a fresh bandage.  ? You may remove the bandage to shower as usual after the first 24 hours, but don't soak the area in water (no swimming) until the sutures are removed.    If surgical tape was used, keep the area clean and dry. If it becomes wet, blot it dry with a towel.    Most facial skin wounds heal without problems. But an infection sometimes occurs despite proper treatment. Watch for the signs of infection listed below.  Follow-up care  Follow up with your healthcare provider as advised. Be sure to return for removal of the stitches as directed. Ask your provider how long stitches should remain in place. If surgical tape closures were used, you may remove them yourself when your provider recommends if they have not fallen off on their own.  When to seek medical advice  Call your healthcare provider right away if any of these occur:    Wound bleeding not controlled by direct pressure    Signs of infection, including increasing pain in the wound, increasing wound redness or swelling, or pus or bad odor coming from the wound    Fever of 100.4 F (38 C) or higher, or as directed by your healthcare provider    Stitches come apart or fall out or surgical tape falls off before 5 days    Wound edges reopen    Wound changes colors    Numbness around the wound   Date Last Reviewed: 7/1/2017 2000-2019 [employer]. [address] [place] [address], [place] [address]. All rights reserved. This information is not intended as a substitute for professional medical care. Always follow your healthcare professional's instructions.

## 2021-05-25 ENCOUNTER — OFFICE VISIT (OUTPATIENT)
Dept: FAMILY MEDICINE | Facility: CLINIC | Age: 41
End: 2021-05-25
Payer: COMMERCIAL

## 2021-05-25 VITALS
WEIGHT: 199 LBS | HEIGHT: 74 IN | SYSTOLIC BLOOD PRESSURE: 124 MMHG | BODY MASS INDEX: 25.54 KG/M2 | TEMPERATURE: 97.9 F | OXYGEN SATURATION: 99 % | HEART RATE: 95 BPM | DIASTOLIC BLOOD PRESSURE: 82 MMHG

## 2021-05-25 DIAGNOSIS — G47.00 INSOMNIA, UNSPECIFIED TYPE: ICD-10-CM

## 2021-05-25 DIAGNOSIS — R63.0 DECREASED APPETITE: ICD-10-CM

## 2021-05-25 DIAGNOSIS — R11.0 NAUSEA: Primary | ICD-10-CM

## 2021-05-25 DIAGNOSIS — F41.9 ANXIETY: ICD-10-CM

## 2021-05-25 DIAGNOSIS — Z11.59 NEED FOR HEPATITIS C SCREENING TEST: ICD-10-CM

## 2021-05-25 LAB
ERYTHROCYTE [DISTWIDTH] IN BLOOD BY AUTOMATED COUNT: 11.9 % (ref 10–15)
ERYTHROCYTE [SEDIMENTATION RATE] IN BLOOD BY WESTERGREN METHOD: 4 MM/H (ref 0–15)
HCT VFR BLD AUTO: 45 % (ref 40–53)
HCV AB SERPL QL IA: NONREACTIVE
HGB BLD-MCNC: 15.6 G/DL (ref 13.3–17.7)
MCH RBC QN AUTO: 33.6 PG (ref 26.5–33)
MCHC RBC AUTO-ENTMCNC: 34.7 G/DL (ref 31.5–36.5)
MCV RBC AUTO: 97 FL (ref 78–100)
PLATELET # BLD AUTO: 251 10E9/L (ref 150–450)
RBC # BLD AUTO: 4.64 10E12/L (ref 4.4–5.9)
WBC # BLD AUTO: 6 10E9/L (ref 4–11)

## 2021-05-25 PROCEDURE — 85027 COMPLETE CBC AUTOMATED: CPT | Performed by: NURSE PRACTITIONER

## 2021-05-25 PROCEDURE — 84443 ASSAY THYROID STIM HORMONE: CPT | Performed by: NURSE PRACTITIONER

## 2021-05-25 PROCEDURE — 36415 COLL VENOUS BLD VENIPUNCTURE: CPT | Performed by: NURSE PRACTITIONER

## 2021-05-25 PROCEDURE — 86803 HEPATITIS C AB TEST: CPT | Performed by: NURSE PRACTITIONER

## 2021-05-25 PROCEDURE — 99214 OFFICE O/P EST MOD 30 MIN: CPT | Performed by: NURSE PRACTITIONER

## 2021-05-25 PROCEDURE — 85652 RBC SED RATE AUTOMATED: CPT | Performed by: NURSE PRACTITIONER

## 2021-05-25 PROCEDURE — 80053 COMPREHEN METABOLIC PANEL: CPT | Performed by: NURSE PRACTITIONER

## 2021-05-25 RX ORDER — HYDROXYZINE HYDROCHLORIDE 25 MG/1
25 TABLET, FILM COATED ORAL
Qty: 30 TABLET | Refills: 1 | Status: SHIPPED | OUTPATIENT
Start: 2021-05-25 | End: 2021-09-22

## 2021-05-25 ASSESSMENT — MIFFLIN-ST. JEOR: SCORE: 1887.41

## 2021-05-25 ASSESSMENT — PATIENT HEALTH QUESTIONNAIRE - PHQ9
SUM OF ALL RESPONSES TO PHQ QUESTIONS 1-9: 8
5. POOR APPETITE OR OVEREATING: NEARLY EVERY DAY

## 2021-05-25 ASSESSMENT — ANXIETY QUESTIONNAIRES
IF YOU CHECKED OFF ANY PROBLEMS ON THIS QUESTIONNAIRE, HOW DIFFICULT HAVE THESE PROBLEMS MADE IT FOR YOU TO DO YOUR WORK, TAKE CARE OF THINGS AT HOME, OR GET ALONG WITH OTHER PEOPLE: SOMEWHAT DIFFICULT
6. BECOMING EASILY ANNOYED OR IRRITABLE: SEVERAL DAYS
1. FEELING NERVOUS, ANXIOUS, OR ON EDGE: NEARLY EVERY DAY
GAD7 TOTAL SCORE: 15
5. BEING SO RESTLESS THAT IT IS HARD TO SIT STILL: MORE THAN HALF THE DAYS
3. WORRYING TOO MUCH ABOUT DIFFERENT THINGS: NEARLY EVERY DAY
7. FEELING AFRAID AS IF SOMETHING AWFUL MIGHT HAPPEN: NOT AT ALL
2. NOT BEING ABLE TO STOP OR CONTROL WORRYING: NEARLY EVERY DAY

## 2021-05-25 NOTE — PATIENT INSTRUCTIONS
Trial of Famotidine (Pepcid) 40 mg at bedtime for 7 days - to see if noted improvement in morning nausea.

## 2021-05-25 NOTE — PROGRESS NOTES
"    Assessment & Plan   Fer was seen today for fatigue, nausea and vomiting.    Diagnoses and all orders for this visit:    Nausea  Decreased appetite  Intermittent vomiting  3 week history of morning nausea, decreased appetite and intermittent post-tussive vomiting.   GERD versus anxiety.   Recommended trial of famotidine 40 mg at bedtime, continue to monitor symptoms.    Will proceed with labs and await results.  Will notify of results once available.    -     TSH with free T4 reflex  -     Comprehensive metabolic panel (BMP + Alb, Alk Phos, ALT, AST, Total. Bili, TP)  -     CBC with platelets  -     ESR: Erythrocyte sedimentation rate    Anxiety  Insomnia  BIBI-7 SCORE 5/25/2021   Total Score 15     PHQ 5/25/2021   PHQ-9 Total Score 8   Q9: Thoughts of better off dead/self-harm past 2 weeks Not at all     Trial of Hydroxyzine 25 mg at bedtime as needed.    Consider therapy referral and medication initiation for anxiety management - patient to consider and will plan follow-up in 4 weeks via video visit.    -     hydrOXYzine (ATARAX) 25 MG tablet; Take 1 tablet (25 mg) by mouth nightly as needed for anxiety (insomnia)    Need for hepatitis C screening test  -     Hepatitis C Screen Reflex to HCV RNA Quant and Genotype    Other orders  -     REVIEW OF HEALTH MAINTENANCE PROTOCOL ORDERS        30 minutes spent on the date of the encounter doing chart review, history and exam, documentation and further activities per the note       BMI:   Estimated body mass index is 25.55 kg/m  as calculated from the following:    Height as of this encounter: 1.88 m (6' 2\").    Weight as of this encounter: 90.3 kg (199 lb).       Return in about 4 weeks (around 6/22/2021) for Nausea/Anxiety Follow-up, Video Visit.    Lorena Barron, JORGE CNP  [facility]        Kevon Neumann is a 41 year old who presents for the following health issues:      HPI     Acute Illness    Patient reports starting to feel " "nauseous, hard time eating and small amount of vomiting 3 weeks ago.   More difficulty with sleep, has always had a hard time with sleep.    +Night sweats.    Decreased appetite.  Has lost some weight.   No abdominal cramping.  No diarrhea.    Feels nausea first and then will have a coughing attack.  Worse in the morning and improves during the day.    Intermittent coughing attacks, 5-20 seconds.    Post-tussive emesis.    Normal bowel movements - no constipation. Typically eats \"clean\" and has no issues with bowel movements.    No one else sick at home.   1 year old and 3 year old at home (URI recently, which resolved).   Denies chest pain, shortness of breath.    Increase in heart rate.  No palpitations.   Intermittent heartburn.      +Anxiety.    History of chronic insomnia.   Previously on amitriptyline for sleep/headaches.    Previous trial of Escitalopram (Lexapro) 10  Mg in 2014.    No family history of depression/anxiety.    Single dad a lot, wife works off hours as a nurse and is in graduate school.      Works in sales - .  Increased work stress.      Acute illness concerns: nausea, vomiting, fatigue  Onset/Duration: 2-3 weeks - intermittent issue - seems to be worse in the morning and gets better as the day goes on  Symptoms:  Fever: no  Chills/Sweats: YES, cold sweats  Headache (location?): no  Sinus Pressure: no  Conjunctivitis:  no  Ear Pain: no  Rhinorrhea: no  Congestion: no  Sore Throat: no  Cough: YES  Wheeze: no  Decreased Appetite: YES  Nausea: YES  Vomiting: YES - intermittently  Diarrhea: no  Dysuria/Freq.: no  Dysuria or Hematuria: no  Fatigue/Achiness: YES  Sick/Strep Exposure: no   Therapies tried and outcome: None    Alcohol:  Slight increase  But less within the past few weeks.   Beer or bourbon, 1-2 week nights or weekend nights.   Can go 2 weeks without alcohol.   No marijuana use.      Review of Systems   Constitutional, HEENT, cardiovascular, pulmonary, gi and gu " "systems are negative, except as otherwise noted.      Objective    /82 (BP Location: Right arm, Cuff Size: Adult Large)   Pulse 95   Temp 97.9  F (36.6  C) (Tympanic)   Ht 1.88 m (6' 2\")   Wt 90.3 kg (199 lb)   SpO2 99%   BMI 25.55 kg/m    Body mass index is 25.55 kg/m .  Physical Exam     GENERAL: healthy, alert and no distress  EYES: Eyes grossly normal to inspection, PERRL and conjunctivae and sclerae normal  HENT: ear canals and TM's normal, nose and mouth without ulcers or lesions  NECK: no adenopathy, no asymmetry, masses, or scars and thyroid normal to palpation  RESP: lungs clear to auscultation - no rales, rhonchi or wheezes  CV: regular rate and rhythm, normal S1 S2, no S3 or S4, no murmur, click or rub, no peripheral edema  ABDOMEN: soft, nontender, no hepatosplenomegaly, no masses and bowel sounds normal  PSYCH: mentation appears normal, affect normal/bright      "

## 2021-05-25 NOTE — LETTER
May 26, 2021      Fer Santacruz  [address] [place] [address]        Dear ,    We are writing to inform you of your test results.      -Normal red blood cell (hgb) levels, normal white blood cell count and normal platelet levels.   -Liver and gallbladder tests are overall normal (ALT,AST, Alk phos, bilirubin) - AST was just slightly above the normal range, kidney function is normal (Cr, GFR), sodium is normal, potassium is normal, calcium is normal, glucose is normal.   -TSH (thyroid stimulating hormone) level is normal which indicates normal thyroid function.   -Hepatitis C antibody screen test shows no signs of a previous hepatitis C infection.   -ESR (inflammatory test) was within a normal range.       If you have further questions about the interpretation of your labs, [email or web address] is a good website to check out for further information.       Please contact the clinic if you have additional questions.  Thank you.     Sincerely,     Lorena Barron, APRN, CNP   [facility]     Resulted Orders   Hepatitis C Screen Reflex to HCV RNA Quant and Genotype   Result Value Ref Range    Hepatitis C Antibody Nonreactive NR^Nonreactive      Comment:      Assay performance characteristics have not been established for newborns,   infants, and children     TSH with free T4 reflex   Result Value Ref Range    TSH 1.50 0.40 - 4.00 mU/L   Comprehensive metabolic panel (BMP + Alb, Alk Phos, ALT, AST, Total. Bili, TP)   Result Value Ref Range    Sodium 138 133 - 144 mmol/L    Potassium 4.6 3.4 - 5.3 mmol/L      Comment:      Specimen slightly hemolyzed, potassium may be falsely elevated    Chloride 104 94 - 109 mmol/L    Carbon Dioxide 29 20 - 32 mmol/L    Anion Gap 5 3 - 14 mmol/L    Glucose 82 70 - 99 mg/dL    Urea Nitrogen 13 7 - 30 mg/dL    Creatinine 0.93 0.66 - 1.25 mg/dL    GFR Estimate >90 >60 mL/min/[1.73_m2]      Comment:      Non  GFR Calc  Starting  12/18/2018, serum creatinine based estimated GFR (eGFR) will be   calculated using the Chronic Kidney Disease Epidemiology Collaboration   (CKD-EPI) equation.      GFR Estimate If Black >90 >60 mL/min/[1.73_m2]      Comment:       GFR Calc  Starting 12/18/2018, serum creatinine based estimated GFR (eGFR) will be   calculated using the Chronic Kidney Disease Epidemiology Collaboration   (CKD-EPI) equation.      Calcium 9.4 8.5 - 10.1 mg/dL    Bilirubin Total 1.2 0.2 - 1.3 mg/dL    Albumin 4.3 3.4 - 5.0 g/dL    Protein Total 7.9 6.8 - 8.8 g/dL    Alkaline Phosphatase 43 40 - 150 U/L    ALT 41 0 - 70 U/L    AST 46 (H) 0 - 45 U/L      Comment:      Specimen is hemolyzed which can falsely elevate AST. Analysis of a   non-hemolyzed specimen may result in a lower value.     CBC with platelets   Result Value Ref Range    WBC 6.0 4.0 - 11.0 10e9/L    RBC Count 4.64 4.4 - 5.9 10e12/L    Hemoglobin 15.6 13.3 - 17.7 g/dL    Hematocrit 45.0 40.0 - 53.0 %    MCV 97 78 - 100 fl    MCH 33.6 (H) 26.5 - 33.0 pg    MCHC 34.7 31.5 - 36.5 g/dL    RDW 11.9 10.0 - 15.0 %    Platelet Count 251 150 - 450 10e9/L   ESR: Erythrocyte sedimentation rate   Result Value Ref Range    Sed Rate 4 0 - 15 mm/h

## 2021-05-26 LAB
ALBUMIN SERPL-MCNC: 4.3 G/DL (ref 3.4–5)
ALP SERPL-CCNC: 43 U/L (ref 40–150)
ALT SERPL W P-5'-P-CCNC: 41 U/L (ref 0–70)
ANION GAP SERPL CALCULATED.3IONS-SCNC: 5 MMOL/L (ref 3–14)
AST SERPL W P-5'-P-CCNC: 46 U/L (ref 0–45)
BILIRUB SERPL-MCNC: 1.2 MG/DL (ref 0.2–1.3)
BUN SERPL-MCNC: 13 MG/DL (ref 7–30)
CALCIUM SERPL-MCNC: 9.4 MG/DL (ref 8.5–10.1)
CHLORIDE SERPL-SCNC: 104 MMOL/L (ref 94–109)
CO2 SERPL-SCNC: 29 MMOL/L (ref 20–32)
CREAT SERPL-MCNC: 0.93 MG/DL (ref 0.66–1.25)
GFR SERPL CREATININE-BSD FRML MDRD: >90 ML/MIN/{1.73_M2}
GLUCOSE SERPL-MCNC: 82 MG/DL (ref 70–99)
POTASSIUM SERPL-SCNC: 4.6 MMOL/L (ref 3.4–5.3)
PROT SERPL-MCNC: 7.9 G/DL (ref 6.8–8.8)
SODIUM SERPL-SCNC: 138 MMOL/L (ref 133–144)
TSH SERPL DL<=0.005 MIU/L-ACNC: 1.5 MU/L (ref 0.4–4)

## 2021-05-26 ASSESSMENT — ANXIETY QUESTIONNAIRES: GAD7 TOTAL SCORE: 15

## 2021-07-10 ENCOUNTER — HEALTH MAINTENANCE LETTER (OUTPATIENT)
Age: 41
End: 2021-07-10

## 2021-09-04 ENCOUNTER — HEALTH MAINTENANCE LETTER (OUTPATIENT)
Age: 41
End: 2021-09-04

## 2021-09-22 ENCOUNTER — OFFICE VISIT (OUTPATIENT)
Dept: FAMILY MEDICINE | Facility: CLINIC | Age: 41
End: 2021-09-22
Payer: COMMERCIAL

## 2021-09-22 VITALS
WEIGHT: 198 LBS | TEMPERATURE: 97 F | RESPIRATION RATE: 16 BRPM | HEART RATE: 108 BPM | HEIGHT: 74 IN | BODY MASS INDEX: 25.41 KG/M2 | SYSTOLIC BLOOD PRESSURE: 112 MMHG | OXYGEN SATURATION: 99 % | DIASTOLIC BLOOD PRESSURE: 86 MMHG

## 2021-09-22 DIAGNOSIS — R19.7 DIARRHEA, UNSPECIFIED TYPE: ICD-10-CM

## 2021-09-22 DIAGNOSIS — D75.89 MACROCYTOSIS: ICD-10-CM

## 2021-09-22 DIAGNOSIS — R79.89 ELEVATED LFTS: ICD-10-CM

## 2021-09-22 DIAGNOSIS — F41.9 ANXIETY: ICD-10-CM

## 2021-09-22 DIAGNOSIS — Z00.00 ROUTINE GENERAL MEDICAL EXAMINATION AT A HEALTH CARE FACILITY: Primary | ICD-10-CM

## 2021-09-22 DIAGNOSIS — D22.9 NUMEROUS MOLES: ICD-10-CM

## 2021-09-22 DIAGNOSIS — F10.20 UNCOMPLICATED ALCOHOL DEPENDENCE (H): ICD-10-CM

## 2021-09-22 DIAGNOSIS — Z13.220 LIPID SCREENING: ICD-10-CM

## 2021-09-22 DIAGNOSIS — G47.00 INSOMNIA, UNSPECIFIED TYPE: ICD-10-CM

## 2021-09-22 PROBLEM — Z13.6 CARDIOVASCULAR SCREENING; LDL GOAL LESS THAN 160: Status: RESOLVED | Noted: 2019-10-11 | Resolved: 2021-09-22

## 2021-09-22 LAB
ERYTHROCYTE [DISTWIDTH] IN BLOOD BY AUTOMATED COUNT: 12 % (ref 10–15)
HCT VFR BLD AUTO: 45.6 % (ref 40–53)
HGB BLD-MCNC: 15.3 G/DL (ref 13.3–17.7)
MCH RBC QN AUTO: 32.8 PG (ref 26.5–33)
MCHC RBC AUTO-ENTMCNC: 33.6 G/DL (ref 31.5–36.5)
MCV RBC AUTO: 98 FL (ref 78–100)
PLATELET # BLD AUTO: 239 10E3/UL (ref 150–450)
RBC # BLD AUTO: 4.66 10E6/UL (ref 4.4–5.9)
WBC # BLD AUTO: 6.9 10E3/UL (ref 4–11)

## 2021-09-22 PROCEDURE — 80061 LIPID PANEL: CPT | Performed by: PHYSICIAN ASSISTANT

## 2021-09-22 PROCEDURE — 90686 IIV4 VACC NO PRSV 0.5 ML IM: CPT | Performed by: PHYSICIAN ASSISTANT

## 2021-09-22 PROCEDURE — 99000 SPECIMEN HANDLING OFFICE-LAB: CPT | Performed by: PHYSICIAN ASSISTANT

## 2021-09-22 PROCEDURE — 85027 COMPLETE CBC AUTOMATED: CPT | Performed by: PHYSICIAN ASSISTANT

## 2021-09-22 PROCEDURE — 83690 ASSAY OF LIPASE: CPT | Performed by: PHYSICIAN ASSISTANT

## 2021-09-22 PROCEDURE — 82977 ASSAY OF GGT: CPT | Performed by: PHYSICIAN ASSISTANT

## 2021-09-22 PROCEDURE — 96127 BRIEF EMOTIONAL/BEHAV ASSMT: CPT | Performed by: PHYSICIAN ASSISTANT

## 2021-09-22 PROCEDURE — 99214 OFFICE O/P EST MOD 30 MIN: CPT | Mod: 25 | Performed by: PHYSICIAN ASSISTANT

## 2021-09-22 PROCEDURE — 99395 PREV VISIT EST AGE 18-39: CPT | Mod: 25 | Performed by: PHYSICIAN ASSISTANT

## 2021-09-22 PROCEDURE — 84425 ASSAY OF VITAMIN B-1: CPT | Mod: 90 | Performed by: PHYSICIAN ASSISTANT

## 2021-09-22 PROCEDURE — 82607 VITAMIN B-12: CPT | Performed by: PHYSICIAN ASSISTANT

## 2021-09-22 PROCEDURE — 36415 COLL VENOUS BLD VENIPUNCTURE: CPT | Performed by: PHYSICIAN ASSISTANT

## 2021-09-22 PROCEDURE — 80076 HEPATIC FUNCTION PANEL: CPT | Performed by: PHYSICIAN ASSISTANT

## 2021-09-22 PROCEDURE — 90471 IMMUNIZATION ADMIN: CPT | Performed by: PHYSICIAN ASSISTANT

## 2021-09-22 RX ORDER — FLUOXETINE 10 MG/1
CAPSULE ORAL
Qty: 60 CAPSULE | Refills: 1 | Status: SHIPPED | OUTPATIENT
Start: 2021-09-22 | End: 2021-11-30 | Stop reason: SINTOL

## 2021-09-22 ASSESSMENT — ENCOUNTER SYMPTOMS
DYSURIA: 0
CONSTIPATION: 0
CHILLS: 0
COUGH: 0
HEARTBURN: 0
NERVOUS/ANXIOUS: 1
HEMATURIA: 0
FREQUENCY: 0
PALPITATIONS: 0
FEVER: 0
EYE PAIN: 0
HEMATOCHEZIA: 0
PARESTHESIAS: 0
SORE THROAT: 0
DIZZINESS: 0
DIARRHEA: 1
ARTHRALGIAS: 0
HEADACHES: 0
NAUSEA: 0
MYALGIAS: 0
SHORTNESS OF BREATH: 0
ABDOMINAL PAIN: 0
JOINT SWELLING: 0
WEAKNESS: 0

## 2021-09-22 ASSESSMENT — ANXIETY QUESTIONNAIRES
GAD7 TOTAL SCORE: 7
IF YOU CHECKED OFF ANY PROBLEMS ON THIS QUESTIONNAIRE, HOW DIFFICULT HAVE THESE PROBLEMS MADE IT FOR YOU TO DO YOUR WORK, TAKE CARE OF THINGS AT HOME, OR GET ALONG WITH OTHER PEOPLE: SOMEWHAT DIFFICULT
2. NOT BEING ABLE TO STOP OR CONTROL WORRYING: SEVERAL DAYS
7. FEELING AFRAID AS IF SOMETHING AWFUL MIGHT HAPPEN: NOT AT ALL
1. FEELING NERVOUS, ANXIOUS, OR ON EDGE: MORE THAN HALF THE DAYS
5. BEING SO RESTLESS THAT IT IS HARD TO SIT STILL: SEVERAL DAYS
6. BECOMING EASILY ANNOYED OR IRRITABLE: NOT AT ALL
3. WORRYING TOO MUCH ABOUT DIFFERENT THINGS: SEVERAL DAYS

## 2021-09-22 ASSESSMENT — PATIENT HEALTH QUESTIONNAIRE - PHQ9
10. IF YOU CHECKED OFF ANY PROBLEMS, HOW DIFFICULT HAVE THESE PROBLEMS MADE IT FOR YOU TO DO YOUR WORK, TAKE CARE OF THINGS AT HOME, OR GET ALONG WITH OTHER PEOPLE: VERY DIFFICULT
5. POOR APPETITE OR OVEREATING: MORE THAN HALF THE DAYS
SUM OF ALL RESPONSES TO PHQ QUESTIONS 1-9: 8
SUM OF ALL RESPONSES TO PHQ QUESTIONS 1-9: 8

## 2021-09-22 ASSESSMENT — MIFFLIN-ST. JEOR: SCORE: 1882.87
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
## 2021-09-22 NOTE — PROGRESS NOTES
"    {PROVIDER CHARTING PREFERENCE:168500}    Kevon Neumann is a 41 year old who presents for the following health issues {ACCOMPANIED BY STATEMENT (Optional):002442}    HPI     Abnormal Mood Symptoms  Onset/Duration: ***  Description: ***  Depression (if yes, do PHQ-9): {.:186449::\"no\"}  Anxiety (if yes, do BIBI-7): {.:267571::\"no\"}  Accompanying Signs & Symptoms:  Still participating in activities that you used to enjoy: {.:197464::\"no\"}  Fatigue: {.:409030::\"no\"}  Irritability: {.:231331::\"no\"}  Difficulty concentrating: {.:499682::\"no\"}  Changes in appetite: {.:476701::\"no\"}  Problems with sleep: {.:099996::\"no\"}  Heart racing/beating fast: {.:464440::\"no\"}  Abnormally elevated, expansive, or irritable mood: {.:009523::\"no\"}  Persistently increased activity or energy: {.:847795::\"no\"}  Thoughts of hurting yourself or others: {.:184063::\"no\"}  History:  Recent stress or major life event: {.:548536::\"no\"}  Prior depression or anxiety: {.:650172::\"None\"}  Family history of depression or anxiety: {.:151851::\"no\"}  Alcohol/drug use: {.:819906::\"no\"}  Difficulty sleeping: {.:744830::\"no\"}  Precipitating or alleviating factors: {.:907042::\"None\"}  Therapies tried and outcome: {.:319051::\"none\"}  PHQ 5/25/2021   PHQ-9 Total Score 8   Q9: Thoughts of better off dead/self-harm past 2 weeks Not at all     BIBI-7 SCORE 5/25/2021   Total Score 15     Diarrhea  Onset/Duration: ***  Description:       Consistency of stool: {description:653529}       Blood in stool: {.:056490::\"no\"}       Number of loose stools past 24 hours: ***  Progression of Symptoms: {.:744010}  Accompanying signs and symptoms:       Fever: {.:543072::\"no\"}       Nausea/Vomiting: {.:790226::\"no\"}       Abdominal pain: {.:657746::\"no\"}       Weight loss: {.:173277::\"no\"}       Episodes of constipation: {.:976859::\"no\"}  History   Ill contacts: {.:036151::\"no\"}  Recent use of antibiotics: {.:993968::\"no\"}  Recent travels: {.:799856::\"no\"}  Recent " "medication-new or changes(Rx or OTC): {.:784452::\"no\"}  Precipitating or alleviating factors: {NONEORCHOOSE:415733::\"None\"}  Therapies tried and outcome: {DIARRHEA THERAPY 2:242187}    Insomnia  Onset/Duration: ***  Description:   Frequency of insomnia:  {FREQUENCY AT NIGHT:983625}  Time to fall asleep (sleep latency): {.:853553}  Middle of night awakening:  {.:809062::\"no\"}  Early morning awakening:  {.:463663::\"no\"}  Progression of Symptoms:  {.:621277}  Accompanying Signs & Symptoms:  Daytime sleepiness/napping: {.:618719::\"no\"}  Excessive snoring/apnea: {.:699791::\"no\"}  Restless legs: {.:258420::\"no\"}  Waking to urinate: {.:398492::\"no\"}  Chronic pain:  {.:892694::\"no\"}  Depression symptoms (if yes, do PHQ9): {.:055269::\"no\"}  Anxiety symptoms (if yes, do BIBI-7): {.:871545::\"no\"}  History:  Prior Insomnia: {.:880997::\"no\"}  New stressful situation: {.:672936::\"no\"}  Precipitating factors:   Caffeine intake: {.:975919::\"no\"}  OTC decongestants: {.:049409::\"no\"}  Any new medications: {.:228432::\"no\"}  Alleviating factors:  Self medicating (alcohol, etc.):  {.:828409::\"no\"}  Stress-reduction (exercise, yoga, meditation etc): {.:261691::\"no\"}  Therapies tried and outcome: {INSOMNIA TREATMENTS TRIED:217130::\"none\"}      {additonal problems for provider to add (Optional):706033}    Review of Systems   {ROS COMP (Optional):924373}      Objective    There were no vitals taken for this visit.  There is no height or weight on file to calculate BMI.  Physical Exam   {Exam List (Optional):762471}    {Diagnostic Test Results (Optional):082884}    {AMBULATORY ATTESTATION (Optional):657106}        "

## 2021-09-23 LAB
ALBUMIN SERPL-MCNC: 4.4 G/DL (ref 3.4–5)
ALP SERPL-CCNC: 45 U/L (ref 40–150)
ALT SERPL W P-5'-P-CCNC: 44 U/L (ref 0–70)
AST SERPL W P-5'-P-CCNC: 38 U/L (ref 0–45)
BILIRUB DIRECT SERPL-MCNC: 0.2 MG/DL (ref 0–0.2)
BILIRUB SERPL-MCNC: 0.9 MG/DL (ref 0.2–1.3)
CHOLEST SERPL-MCNC: 216 MG/DL
FASTING STATUS PATIENT QL REPORTED: NO
GGT SERPL-CCNC: 26 U/L (ref 0–75)
HDLC SERPL-MCNC: 49 MG/DL
LDLC SERPL CALC-MCNC: 120 MG/DL
LIPASE SERPL-CCNC: 266 U/L (ref 73–393)
NONHDLC SERPL-MCNC: 167 MG/DL
PROT SERPL-MCNC: 8.2 G/DL (ref 6.8–8.8)
TRIGL SERPL-MCNC: 233 MG/DL
VIT B12 SERPL-MCNC: 548 PG/ML (ref 193–986)

## 2021-09-23 ASSESSMENT — ANXIETY QUESTIONNAIRES: GAD7 TOTAL SCORE: 7

## 2021-09-23 ASSESSMENT — PATIENT HEALTH QUESTIONNAIRE - PHQ9: SUM OF ALL RESPONSES TO PHQ QUESTIONS 1-9: 8

## 2021-09-27 LAB — VIT B1 PYROPHOSHATE BLD-SCNC: 143 NMOL/L

## 2021-09-27 NOTE — RESULT ENCOUNTER NOTE
Thien  I have reviewed your recent labs. Here are the results:    -Normal red blood cell (hgb) levels, normal white blood cell count and normal platelet levels.  -LDL(bad) cholesterol level is elevated, and your triglycerides are elevated which can increase your heart disease risk.  A diet high in fat and simple carbohydrates, genetics and being overweight can contribute to this. ADVISE: exercising 150 minutes of aerobic exercise per week (30 minutes for 5 days per week or 50 minutes for 3 days per week are options), eating a low saturated fat/low carbohydrate diet, and omega-3 fatty acids (fish oil) 1278-1665 mg daily are helpful to improve this. In 12 months, you should recheck your fasting cholesterol panel by scheduling a lab-only appointment.  -Liver and gallbladder tests (ALT,AST, GGT, Alk phos,bilirubin) are normal.  -B12 level is normal.  -Pancreatic function is normal (lipase)  -B1 (thiamine) level is normal.    For additional lab test information, labtestsonline.org is an excellent reference.       If you have any questions please do not hesitate to contact our office via phone ([phone]) or MyChart.    Meghna Pope, MS, PA-C  [facility] [place]

## 2021-10-18 ENCOUNTER — TELEPHONE (OUTPATIENT)
Dept: FAMILY MEDICINE | Facility: CLINIC | Age: 41
End: 2021-10-18

## 2021-10-18 NOTE — TELEPHONE ENCOUNTER
Pt has 2 appts for the same thing the weeks of 11/4, please clarify which appt he desires and cancel the other appt.      Meghna Pope MBA, MS, PA-C

## 2021-10-18 NOTE — TELEPHONE ENCOUNTER
Left non-detailed message for patient to call back.  Please verify which upcoming appointment the patient wants with Meghna   (see previous notes for details)    Thanks Vidya

## 2021-10-25 ENCOUNTER — MYC MEDICAL ADVICE (OUTPATIENT)
Dept: FAMILY MEDICINE | Facility: CLINIC | Age: 41
End: 2021-10-25

## 2021-11-30 ENCOUNTER — OFFICE VISIT (OUTPATIENT)
Dept: FAMILY MEDICINE | Facility: CLINIC | Age: 41
End: 2021-11-30
Payer: COMMERCIAL

## 2021-11-30 VITALS
WEIGHT: 199 LBS | HEIGHT: 74 IN | DIASTOLIC BLOOD PRESSURE: 88 MMHG | HEART RATE: 117 BPM | SYSTOLIC BLOOD PRESSURE: 137 MMHG | TEMPERATURE: 97.6 F | OXYGEN SATURATION: 96 % | BODY MASS INDEX: 25.54 KG/M2

## 2021-11-30 DIAGNOSIS — L84 CORN OR CALLUS: ICD-10-CM

## 2021-11-30 DIAGNOSIS — F41.9 ANXIETY: ICD-10-CM

## 2021-11-30 DIAGNOSIS — R25.1 TREMOR: ICD-10-CM

## 2021-11-30 DIAGNOSIS — R41.89 BRAIN FOG: ICD-10-CM

## 2021-11-30 DIAGNOSIS — F10.20 UNCOMPLICATED ALCOHOL DEPENDENCE (H): Primary | ICD-10-CM

## 2021-11-30 DIAGNOSIS — R68.89 COLD SWEAT: ICD-10-CM

## 2021-11-30 DIAGNOSIS — R53.83 OTHER FATIGUE: ICD-10-CM

## 2021-11-30 LAB
BASOPHILS # BLD AUTO: 0.1 10E3/UL (ref 0–0.2)
BASOPHILS NFR BLD AUTO: 1 %
EOSINOPHIL # BLD AUTO: 0.1 10E3/UL (ref 0–0.7)
EOSINOPHIL NFR BLD AUTO: 1 %
ERYTHROCYTE [DISTWIDTH] IN BLOOD BY AUTOMATED COUNT: 11.6 % (ref 10–15)
HCT VFR BLD AUTO: 45 % (ref 40–53)
HGB BLD-MCNC: 15.4 G/DL (ref 13.3–17.7)
IMM GRANULOCYTES # BLD: 0 10E3/UL
IMM GRANULOCYTES NFR BLD: 0 %
LYMPHOCYTES # BLD AUTO: 1.9 10E3/UL (ref 0.8–5.3)
LYMPHOCYTES NFR BLD AUTO: 19 %
MCH RBC QN AUTO: 33 PG (ref 26.5–33)
MCHC RBC AUTO-ENTMCNC: 34.2 G/DL (ref 31.5–36.5)
MCV RBC AUTO: 96 FL (ref 78–100)
MONOCYTES # BLD AUTO: 0.5 10E3/UL (ref 0–1.3)
MONOCYTES NFR BLD AUTO: 5 %
NEUTROPHILS # BLD AUTO: 7.4 10E3/UL (ref 1.6–8.3)
NEUTROPHILS NFR BLD AUTO: 74 %
PLATELET # BLD AUTO: 269 10E3/UL (ref 150–450)
RBC # BLD AUTO: 4.67 10E6/UL (ref 4.4–5.9)
WBC # BLD AUTO: 10.1 10E3/UL (ref 4–11)

## 2021-11-30 PROCEDURE — 86769 SARS-COV-2 COVID-19 ANTIBODY: CPT | Mod: 90 | Performed by: PHYSICIAN ASSISTANT

## 2021-11-30 PROCEDURE — 11055 PARING/CUTG B9 HYPRKER LES 1: CPT | Performed by: PHYSICIAN ASSISTANT

## 2021-11-30 PROCEDURE — 80050 GENERAL HEALTH PANEL: CPT | Performed by: PHYSICIAN ASSISTANT

## 2021-11-30 PROCEDURE — 36415 COLL VENOUS BLD VENIPUNCTURE: CPT | Performed by: PHYSICIAN ASSISTANT

## 2021-11-30 PROCEDURE — 99214 OFFICE O/P EST MOD 30 MIN: CPT | Mod: 25 | Performed by: PHYSICIAN ASSISTANT

## 2021-11-30 PROCEDURE — 99000 SPECIMEN HANDLING OFFICE-LAB: CPT | Performed by: PHYSICIAN ASSISTANT

## 2021-11-30 RX ORDER — PROPRANOLOL HYDROCHLORIDE 20 MG/1
20 TABLET ORAL 3 TIMES DAILY PRN
Qty: 90 TABLET | Refills: 0 | Status: SHIPPED | OUTPATIENT
Start: 2021-11-30 | End: 2022-10-26

## 2021-11-30 ASSESSMENT — ANXIETY QUESTIONNAIRES
GAD7 TOTAL SCORE: 4
3. WORRYING TOO MUCH ABOUT DIFFERENT THINGS: NOT AT ALL
2. NOT BEING ABLE TO STOP OR CONTROL WORRYING: NOT AT ALL
1. FEELING NERVOUS, ANXIOUS, OR ON EDGE: MORE THAN HALF THE DAYS
7. FEELING AFRAID AS IF SOMETHING AWFUL MIGHT HAPPEN: NOT AT ALL
6. BECOMING EASILY ANNOYED OR IRRITABLE: NOT AT ALL
5. BEING SO RESTLESS THAT IT IS HARD TO SIT STILL: NOT AT ALL
7. FEELING AFRAID AS IF SOMETHING AWFUL MIGHT HAPPEN: NOT AT ALL
GAD7 TOTAL SCORE: 4
8. IF YOU CHECKED OFF ANY PROBLEMS, HOW DIFFICULT HAVE THESE MADE IT FOR YOU TO DO YOUR WORK, TAKE CARE OF THINGS AT HOME, OR GET ALONG WITH OTHER PEOPLE?: SOMEWHAT DIFFICULT
GAD7 TOTAL SCORE: 4
4. TROUBLE RELAXING: MORE THAN HALF THE DAYS

## 2021-11-30 ASSESSMENT — MIFFLIN-ST. JEOR: SCORE: 1887.41

## 2021-11-30 ASSESSMENT — PATIENT HEALTH QUESTIONNAIRE - PHQ9
10. IF YOU CHECKED OFF ANY PROBLEMS, HOW DIFFICULT HAVE THESE PROBLEMS MADE IT FOR YOU TO DO YOUR WORK, TAKE CARE OF THINGS AT HOME, OR GET ALONG WITH OTHER PEOPLE: SOMEWHAT DIFFICULT
SUM OF ALL RESPONSES TO PHQ QUESTIONS 1-9: 12
SUM OF ALL RESPONSES TO PHQ QUESTIONS 1-9: 12

## 2021-11-30 NOTE — PROGRESS NOTES
Assessment & Plan     Uncomplicated alcohol dependence (H)  Poorly controlled.  Advised that current use is concerning and that this is a depressant and will make it very difficult to improve mental health with this concurrent condition.  Patient will actively work to decrease use and we will discuss in the near future.    Anxiety  Cold sweat  Tremor  Other fatigue  Brain fog  Suboptimally improved.  Suspect this is related to recent illness and not from fluoxetine, however, unable to determine because of the coinciding timelines.  Psychotherapy recommended and he plans to start this tomorrow.  Recommend continued discontinuation of fluoxetine due to persistent symptoms of cold sweats/tremors/fatigue/brain fog as it is possible that these are aggravated by the recent start of fluoxetine.  Will also do labs to rule out underlying secondary causes.  I will send him a follow-up [employer] message in 3 weeks with a PHQ-9/BIBI/audit questionnaire to see how he is doing after a longer washout timeframe.  - propranolol (INDERAL) 20 MG tablet  Dispense: 90 tablet; Refill: 0  - TSH with free T4 reflex  - COVID-19 Guille RBD Mary & Titer Reflex    Corn or callus  Lesion pared down per note in the physical exam below.  Patient advised to use over-the-counter corn pads to continue to decrease the size of this lesion.  Advised that this may recur and paring along with treatment with these types of pads is the continued recommendation.  - SHAV SKIN LES <5MM REMAINDR BODY       Follow Up Actions Taken  Patient counseled, no additional follow up at this time.     Return in about 3 weeks (around 12/21/2021) for [employer] message will be sent with updated questionnaires to see how his symptoms are at that time..    Meghna Pope PA-C  [facility]    Kevon Neumann is a 41 year old who presents for the following health issues     Depression/anxiety  Patient started fluoxetine 9/22/2021.  At that time   "reported that his job is very stressful and he had taken work off for a week due to the toxic environment.  Since that time he has put in his notice and has not been working for 2 months time.  He is not looking for work and states that he can \"not work for a while because of his savings \".  His wife is working on her nurse practitioner masters program and working part-time.  He does have a 4-year-old and 2-year-old.  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  Since stopping work the kids are still in  but this is now part-time.  He reports that he does not have much of a routine but also states that this is because he has felt profoundly fatigued with chills and tremors since early October.  Upon further questioning, he had symptoms of a low-grade fever, vomiting, and cough.  He was never tested for COVID-19.  He was vaccinated in May 2021.    He does not know if the fatigue, tremors, worsened depression are due to the medication or if it is from his illness.  His last dose of fluoxetine was Sunday, 11/28/2021.  He feels that he is gradually getting better with regard to all the a forementioned symptoms.     He has his first virtual psychotherapy tomorrow.  That denies any thoughts of self-harm or hurting anyone else.    Historical depression medication use:  Lexapro - (2014) - worked great - sexual side effects  Sertaline (2014) - worked well - sexual side effects  Buspar - (2014) -  didn't work well for anxiety     EtOH  At our last visit on 9/22/2021 the patient reported drinking 7-9 alcoholic beverages 3 to 4 days weekly.  He states that he does this primarily socially.  He is not concerned about abuse, however, has previously mentioned that his use increased as a form of self-medication with his previous work stressors.  He has not really focused or tried to discontinue or decrease his alcohol use.  B12, lipase, thiamine labs were normal at his " September 2021 visit as well as liver function studies.    Left foot pain  Patient notes a painful bump on the bottom of his left outer foot.  He denies any trauma or falls.  He states it feels that something is stuck in the area of concern.      History of Present Illness       Mental Health Follow-up:  Patient presents to follow-up on Anxiety.    Patient's anxiety since last visit has been:  No change  The patient is having other symptoms associated with anxiety.  Any significant life events: job concerns  Patient is not feeling anxious or having panic attacks.  Patient has no concerns about alcohol or drug use.     Social History  Tobacco Use    Smoking status: Former Smoker      Packs/day: 0.50      Years: 8.00      Pack years: 4      Quit date: 1/18/2006      Years since quitting: 15.8    Smokeless tobacco: Never Used  Vaping Use    Vaping Use: Not on file  Alcohol use: Yes    Alcohol/week: 21.0 standard drinks    Types: 21 Standard drinks or equivalent per week    Comment: 2-3 times weekly, 7-9 drinks per occassion  Drug use: Yes    Types: Marijuana    Comment: CBD recently      Today's PHQ-9         PHQ-9 Total Score:     (P) 12   PHQ-9 Q9 Thoughts of better off dead/self-harm past 2 weeks :   (P) Not at all   Thoughts of suicide or self harm:      Self-harm Plan:        Self-harm Action:          Safety concerns for self or others:           He eats 4 or more servings of fruits and vegetables daily.He consumes 1 sweetened beverage(s) daily.He exercises with enough effort to increase his heart rate 60 or more minutes per day.  He exercises with enough effort to increase his heart rate 6 days per week. He is missing 1 dose(s) of medications per week.       Stopped medication 11/28/2021- Fluoxetine 10mg - 2 caps, due to side effects -- tremors and cold sweats 2-3 weeks after increasing to the 20mg - progressively getting worse.  PHQ 5/25/2021 9/22/2021 11/30/2021   PHQ-9 Total Score 8 8 12   Q9: Thoughts of  "better off dead/self-harm past 2 weeks Not at all Not at all Not at all     BIBI-7 SCORE 5/25/2021 9/22/2021 11/30/2021   Total Score - - 4 (minimal anxiety)   Total Score 15 7 4       Review of Systems   Constitutional, HEENT, cardiovascular, pulmonary, GI, , musculoskeletal, neuro, skin, endocrine and psych systems are negative, except as otherwise noted.      Objective    /88 (BP Location: Left arm, Patient Position: Chair, Cuff Size: Adult Large)   Pulse 117   Temp 97.6  F (36.4  C) (Tympanic)   Ht 1.88 m (6' 2\")   Wt 90.3 kg (199 lb)   SpO2 96%   BMI 25.55 kg/m    Body mass index is 25.55 kg/m .  Physical Exam   GENERAL: healthy, alert and no distress  EYES: Eyes grossly normal to inspection  RESP: lungs clear to auscultation - no rales, rhonchi or wheezes  CV: regular rate and rhythm, normal S1 S2, no S3 or S4, no murmur, click or rub, no peripheral edema and peripheral pulses strong  MS: no gross musculoskeletal defects noted, no edema  SKIN: no suspicious lesions or rashes, bottom of left foot reveals a well demarcated area of density measuring 4 mm in diameter.  This was pared down with a flexible blade to reveal a white base consistent with a corn or callus.  NEURO: Normal strength and tone, mentation intact and speech normal  PSYCH: mentation appears normal, affect normal/bright    Results for orders placed or performed in visit on 11/30/21 (from the past 24 hour(s))   CBC with platelets and differential    Narrative    The following orders were created for panel order CBC with platelets and differential.  Procedure                               Abnormality         Status                     ---------                               -----------         ------                     CBC with platelets and d...[[id number]]                      Final result                 Please view results for these tests on the individual orders.   CBC with platelets and differential   Result Value Ref Range    " WBC Count 10.1 4.0 - 11.0 10e3/uL    RBC Count 4.67 4.40 - 5.90 10e6/uL    Hemoglobin 15.4 13.3 - 17.7 g/dL    Hematocrit 45.0 40.0 - 53.0 %    MCV 96 78 - 100 fL    MCH 33.0 26.5 - 33.0 pg    MCHC 34.2 31.5 - 36.5 g/dL    RDW 11.6 10.0 - 15.0 %    Platelet Count 269 150 - 450 10e3/uL    % Neutrophils 74 %    % Lymphocytes 19 %    % Monocytes 5 %    % Eosinophils 1 %    % Basophils 1 %    % Immature Granulocytes 0 %    Absolute Neutrophils 7.4 1.6 - 8.3 10e3/uL    Absolute Lymphocytes 1.9 0.8 - 5.3 10e3/uL    Absolute Monocytes 0.5 0.0 - 1.3 10e3/uL    Absolute Eosinophils 0.1 0.0 - 0.7 10e3/uL    Absolute Basophils 0.1 0.0 - 0.2 10e3/uL    Absolute Immature Granulocytes 0.0 <=0.4 10e3/uL               Answers for HPI/ROS submitted by the patient on 11/30/2021  If you checked off any problems, how difficult have these problems made it for you to do your work, take care of things at home, or get along with other people?: Somewhat difficult  PHQ9 TOTAL SCORE: 12  BIBI 7 TOTAL SCORE: 4

## 2021-12-01 ASSESSMENT — PATIENT HEALTH QUESTIONNAIRE - PHQ9: SUM OF ALL RESPONSES TO PHQ QUESTIONS 1-9: 12

## 2021-12-01 ASSESSMENT — ANXIETY QUESTIONNAIRES: GAD7 TOTAL SCORE: 4

## 2021-12-02 LAB
ALBUMIN SERPL-MCNC: 4.2 G/DL (ref 3.4–5)
ALP SERPL-CCNC: 45 U/L (ref 40–150)
ALT SERPL W P-5'-P-CCNC: 51 U/L (ref 0–70)
ANION GAP SERPL CALCULATED.3IONS-SCNC: 7 MMOL/L (ref 3–14)
AST SERPL W P-5'-P-CCNC: 36 U/L (ref 0–45)
BILIRUB SERPL-MCNC: 1.1 MG/DL (ref 0.2–1.3)
BUN SERPL-MCNC: 21 MG/DL (ref 7–30)
CALCIUM SERPL-MCNC: 9.5 MG/DL (ref 8.5–10.1)
CHLORIDE BLD-SCNC: 106 MMOL/L (ref 94–109)
CO2 SERPL-SCNC: 27 MMOL/L (ref 20–32)
CREAT SERPL-MCNC: 1.24 MG/DL (ref 0.66–1.25)
GFR SERPL CREATININE-BSD FRML MDRD: 73 ML/MIN/1.73M2
GLUCOSE BLD-MCNC: 99 MG/DL (ref 70–99)
POTASSIUM BLD-SCNC: 4 MMOL/L (ref 3.4–5.3)
PROT SERPL-MCNC: 8 G/DL (ref 6.8–8.8)
SARS-COV-2 AB SERPL IA-ACNC: >250 U/ML
SARS-COV-2 AB SERPL-IMP: POSITIVE
SODIUM SERPL-SCNC: 140 MMOL/L (ref 133–144)
TSH SERPL DL<=0.005 MIU/L-ACNC: 0.83 MU/L (ref 0.4–4)

## 2021-12-03 NOTE — RESULT ENCOUNTER NOTE
Thien  I have reviewed your recent labs. Here are the results:    -Normal red blood cell (hgb) levels, normal white blood cell count and normal platelet levels.  -Liver and gallbladder tests are normal (ALT,AST, Alk phos, bilirubin), kidney function is normal (Cr, GFR), sodium is normal, potassium is normal, calcium is normal, glucose is normal.  -TSH (thyroid stimulating hormone) level is normal which indicates normal thyroid function.  -COVID antibodies are present which could be from a recent infection or your previous immunizations - unfortunately, there is no way to differentiate.  Your previous symptoms of fatigue is very suspect for a recent infection as we discussed.      Keep me posted on how you are doing off of your medication in ~3 weeks when I send you a [employer] message in follow up.    For additional lab test information, [email or web address] is an excellent reference.      If you have any questions please do not hesitate to contact our office via phone ([phone]) or [employer].    Meghna Poep, MS, PA-C  [facility] [place]

## 2022-01-19 ENCOUNTER — E-VISIT (OUTPATIENT)
Dept: URGENT CARE | Facility: URGENT CARE | Age: 42
End: 2022-01-19
Payer: COMMERCIAL

## 2022-01-19 DIAGNOSIS — R06.2 WHEEZE: ICD-10-CM

## 2022-01-19 DIAGNOSIS — J06.9 VIRAL UPPER RESPIRATORY TRACT INFECTION WITH COUGH: Primary | ICD-10-CM

## 2022-01-19 DIAGNOSIS — U07.1 INFECTION DUE TO 2019 NOVEL CORONAVIRUS: Primary | ICD-10-CM

## 2022-01-19 PROCEDURE — 99207 PR NON-BILLABLE SERV PER CHARTING: CPT | Performed by: PHYSICIAN ASSISTANT

## 2022-01-19 PROCEDURE — 99421 OL DIG E/M SVC 5-10 MIN: CPT | Performed by: FAMILY MEDICINE

## 2022-01-19 RX ORDER — ALBUTEROL SULFATE 90 UG/1
2 AEROSOL, METERED RESPIRATORY (INHALATION) EVERY 6 HOURS
Qty: 18 G | Refills: 0 | Status: SHIPPED | OUTPATIENT
Start: 2022-01-19 | End: 2022-10-26

## 2022-01-19 NOTE — PATIENT INSTRUCTIONS
Dear Fer Santacruz    At this time, this is a viral illness and antibiotics will not help.  You will not be charged for a visit.      Viral Bronchitis (Adult)    You have a viral bronchitis. Bronchitis is inflammation and swelling of the lining of the lungs. This is often caused by an infection. Symptoms include a dry, hacking cough that is worse at night. The cough may bring up yellow-green mucus. You may also feel short of breath or wheeze. Other symptoms may include tiredness, chest discomfort, and chills.  Bronchitis that is caused by a virus is not treated with antibiotics. Instead, medicines may be given to help relieve symptoms. Symptoms can last up to 2 weeks, although the cough may last much longer.  This illness is contagious during the first few days and is spread through the air by coughing and sneezing, or by direct contact (touching the sick person and then touching your own eyes, nose, or mouth).  Most viral illnesses resolve within 10 to 14 days with rest and simple home remedies, although they may sometimes last for several weeks.  Home care    If symptoms are severe, rest at home for the first 2 to 3 days. When you go back to your usual activities, don't let yourself get too tired.    Do not smoke. Also avoid being exposed to secondhand smoke.    You may use over-the-counter medicine to control fever or pain, unless another pain medicine was prescribed. If you have chronic liver or kidney disease or have ever had a stomach ulcer or gastrointestinal bleeding, talk with your healthcare provider before using these medicines. Also talk to your provider if you are taking medicine to prevent blood clots. Aspirin should never be given to anyone younger than 18 years of age who is ill with a viral infection or fever. It may cause severe liver or brain damage.    Your appetite may be poor, so a light diet is fine. Avoid dehydration by drinking 6 to 8 glasses of fluids per day (such as water, soft  drinks, sports drinks, juices, tea, or soup). Extra fluids will help loosen secretions in the nose and lungs.    Over-the-counter cough, cold, and sore-throat medicines will not shorten the length of the illness, but they may help to reduce symptoms. Don't use decongestants if you have high blood pressure.  Follow-up care  Follow up with your healthcare provider, or as advised. If you had an X-ray or ECG (electrocardiogram), a specialist will review it. You will be notified of any new findings that may affect your care.  If you are age 65 or older, or if you have a chronic lung disease or condition that affects your immune system, or you smoke, ask your healthcare provider about getting a pneumococcal vaccine and a yearly flu shot (influenza vaccine).  When to seek medical advice  Call your healthcare provider right away if any of these occur:    Fever of 100.4 F (38 C) or higher, or as directed by your healthcare provider    Coughing up increased amounts of colored sputum    Weakness, drowsiness, headache, facial pain, ear pain, or a stiff neck  Call 911  Call 911 if any of these occur:    Coughing up blood    Worsening weakness, drowsiness, headache, or stiff neck    Trouble breathing, wheezing, or pain with breathing  [employer] last reviewed this educational content on 6/1/2018 2000-2021 [employer]. All rights reserved. This information is not intended as a substitute for professional medical care. Always follow your healthcare professional's instructions.          Segun Nieves PA-C

## 2022-03-17 ENCOUNTER — E-VISIT (OUTPATIENT)
Dept: URGENT CARE | Facility: CLINIC | Age: 42
End: 2022-03-17
Payer: COMMERCIAL

## 2022-03-17 DIAGNOSIS — H10.32 ACUTE BACTERIAL CONJUNCTIVITIS OF LEFT EYE: Primary | ICD-10-CM

## 2022-03-17 PROCEDURE — 99421 OL DIG E/M SVC 5-10 MIN: CPT | Performed by: PHYSICIAN ASSISTANT

## 2022-03-17 RX ORDER — POLYMYXIN B SULFATE AND TRIMETHOPRIM 1; 10000 MG/ML; [USP'U]/ML
1-2 SOLUTION OPHTHALMIC EVERY 4 HOURS
Qty: 5 ML | Refills: 0 | Status: SHIPPED | OUTPATIENT
Start: 2022-03-17 | End: 2022-03-24

## 2022-03-17 NOTE — PATIENT INSTRUCTIONS
Thank you for choosing us for your care. I have placed an order for a prescription so that you can start treatment. View your full visit summary for details by clicking on the link below. Your pharmacist will able to address any questions you may have about the medication.     If you re not feeling better within 2-3 days, please schedule an appointment.  You can schedule an appointment right here in [facility], or call [phone]  If the visit is for the same symptoms as your eVisit, we ll refund the cost of your eVisit if seen within seven days.      Bacterial Conjunctivitis    You have an infection in the membranes covering the white part of the eye. This part of the eye is called the conjunctiva. The infection is called conjunctivitis. The most common symptoms of conjunctivitis include a thick, pus-like discharge from the eye, swollen eyelids, redness, eyelids sticking together upon awakening, and a gritty or scratchy feeling in the eye. Your infection was caused by bacteria. It may be treated with medicine. With treatment, the infection takes about 7 to 10 days to resolve.   Home care    Use prescribed antibiotic eye drops or ointment as directed to treat the infection.    Apply a warm compress (towel soaked in warm water) to the affected eye 3 to 4 times a day. Do this just before applying medicine to the eye.    Use a warm, wet cloth to wipe away crusting of the eyelids in the morning. This is caused by mucus drainage during the night. You may also use saline irrigating solution or artificial tears to rinse away mucus in the eye. Do not put a patch over the eye.    Wash your hands before and after touching the infected eye. This is to prevent spreading the infection to the other eye, and to other people. Don't share your towels or washcloths with others.    You may use acetaminophen or ibuprofen to control pain, unless another medicine was prescribed. Talk with your healthcare provider before using these  medicines if you have chronic liver or kidney disease. Also talk with your provider if you have ever had a stomach ulcer or digestive bleeding.    Don't wear contact lenses until your eyes have healed and all symptoms are gone.    Follow-up care  Follow up with your healthcare provider, or as advised.  When to seek medical advice  Call your healthcare provider right away if any of these occur:    Worsening vision    Increasing pain in the eye    Increasing swelling or redness of the eyelid    Redness spreading around the eye  [employer] last reviewed this educational content on 4/1/2020 2000-2021 [employer]. All rights reserved. This information is not intended as a substitute for professional medical care. Always follow your healthcare professional's instructions.

## 2022-04-14 ENCOUNTER — OFFICE VISIT (OUTPATIENT)
Dept: PODIATRY | Facility: CLINIC | Age: 42
End: 2022-04-14
Payer: COMMERCIAL

## 2022-04-14 VITALS
DIASTOLIC BLOOD PRESSURE: 70 MMHG | WEIGHT: 198 LBS | HEIGHT: 74 IN | SYSTOLIC BLOOD PRESSURE: 118 MMHG | BODY MASS INDEX: 25.41 KG/M2

## 2022-04-14 DIAGNOSIS — B07.0 PLANTAR WART, LEFT FOOT: Primary | ICD-10-CM

## 2022-04-14 PROCEDURE — 17110 DESTRUCTION B9 LES UP TO 14: CPT | Performed by: PODIATRIST

## 2022-04-14 PROCEDURE — 99203 OFFICE O/P NEW LOW 30 MIN: CPT | Mod: 25 | Performed by: PODIATRIST

## 2022-04-14 RX ORDER — ZINC SULFATE 50(220)MG
CAPSULE ORAL
Qty: 60 CAPSULE | Refills: 2 | Status: SHIPPED | OUTPATIENT
Start: 2022-04-14 | End: 2022-10-26

## 2022-04-14 NOTE — PROGRESS NOTES
"Foot & Ankle Surgery  April 14, 2022    CC: \"Corn on foot\"    I was asked to see Fer Santacruz regarding the chief complaint by: Self    HPI:  Pt is a 42 year old male who presents with above complaint.  5-month history of left foot pain, including aching and throbbing.  Pain \"ranges\" from a \"1-8\" out of 10.  Painful \"half the day\".  This is worse with \"walking or pressure on\".  \"PA removed it, I have to ask with scalpel, corn strips\".      ROS:   Pos for CC.  The patient denies current nausea, vomiting, chills, fevers, belly pain, calf pain, chest pain or SOB.  Complete remainder of ROS is otherwise neg.    VITALS:  There were no vitals filed for this visit.    PMH:    Past Medical History:   Diagnosis Date     NO ACTIVE PROBLEMS        SXHX:    Past Surgical History:   Procedure Laterality Date     ARTHROSCOPY SHOULDER DISTAL CLAVICLE REPAIR Left     left shoulder - AC joint       UMBILICAL HERNIA REPAIR  2019     wisdom teeth          MEDS:    Current Outpatient Medications   Medication     albuterol (PROAIR HFA) 108 (90 Base) MCG/ACT inhaler     propranolol (INDERAL) 20 MG tablet     No current facility-administered medications for this visit.       ALL:     Allergies   Allergen Reactions     Penicillins Rash       FMH:    Family History   Problem Relation Age of Onset     Breast Cancer Mother 63     Osteoporosis Mother      Other - See Comments Father 45        wegeners granulomatosis     No Known Problems Sister      Cancer Maternal Grandmother 90        unknown type     Cancer Maternal Grandfather 90        unknown type     Lung Cancer Paternal Grandfather         smoker     Colon Cancer No family hx of      Prostate Cancer No family hx of        SocHx:    Social History     Socioeconomic History     Marital status:      Spouse name: Gracie      Number of children: 2     Years of education: Not on file     Highest education level: Not on file   Occupational History     Occupation:   "     Comment: [employer]    Tobacco Use     Smoking status: Former Smoker     Packs/day: 0.50     Years: 8.00     Pack years: 4.00     Quit date: 2006     Years since quittin.2     Smokeless tobacco: Never Used   Vaping Use     Vaping Use: Not on file   Substance and Sexual Activity     Alcohol use: Yes     Alcohol/week: 21.0 standard drinks     Types: 21 Standard drinks or equivalent per week     Comment: 2-3 times weekly, 7-9 drinks per occassion     Drug use: Yes     Types: Marijuana     Comment: CBD recently     Sexual activity: Yes     Partners: Female     Comment:  - Gracie    Other Topics Concern     Parent/sibling w/ CABG, MI or angioplasty before 65F 55M? Not Asked   Social History Narrative     Not on file     Social Determinants of Health     Financial Resource Strain: Not on file   Food Insecurity: Not on file   Transportation Needs: Not on file   Physical Activity: Not on file   Stress: Not on file   Social Connections: Not on file   Intimate Partner Violence: Not on file   Housing Stability: Not on file           EXAMINATION:  Gen:   No apparent distress  Neuro:   A&Ox3, no deficits  Psych:    Answering questions appropriately for age and situation with normal affect  Head:    NCAT  Eye:    Visual scanning without deficit  Ear:    Response to auditory stimuli wnl  Lung:    Non-labored breathing on RA noted  Abd:    NTND per patient report  Lymph:    Neg for pitting/non-pitting edema BLE  Vasc:    Pulses palpable, CFT minimally delayed  Neuro:    Light touch sensation intact to all sensory nerve distributions without paresthesias  Derm:    Skin lesion plantar lateral left arch measuring 5 x 5 mm with divergent skin lines, central peppered appearance and worse pain with lateral compression versus direct pressure consistent with a plantar wart  MSK:    ROM, strength wnl without limitation, no pain on palpation noted.  Calf:    Neg for redness, swelling or tenderness      Assessment:  42 year  old male with plantar wart less than 15 individual lesions left foot      Plan:  Discussed etiologies, anatomy and options  1.  Plantar wart less than 15 individual lesions left foot  -I personally reviewed and interpreted the patient's lower extremity history pertinent to today's visit, including imaging/labs, in preparation for initiating a treatment program.  -discussed that plantar warts are very persistent, and are tough to eradicate even with aggressive treatments.  Will continue with every-2-weeks clinic treatment and daily home therapies until resolution of wart or until current plan is no longer sufficient, and will then consider Dermatology referral  -Rx for Zinc sulfate  -Rx for Mediplast    Follow up:  2 weeks or sooner with acute issues      Patient's medical history was reviewed today      Jacob Edward DPM [facility] FACFAOM  Podiatric Foot & Ankle Surgeon  [facility]  [phone]    Disclaimer: This note consists of symbols derived from keyboarding, dictation and/or voice recognition software. As a result, there may be errors in the script that have gone undetected. Please consider this when interpreting information found in this chart.

## 2022-04-14 NOTE — PATIENT INSTRUCTIONS
"Thank you for choosing [facility] Podiatry / Foot & Ankle Surgery!    DR MALONE'S CLINIC:  [facility]   [address]   [place] [address]      TRIAGE LINE: [phone]  APPOINTMENTS: [phone]  RADIOLOGY: [phone]  SET UP SURGERY: [phone]  BILLING QUESTIONS: [phone]  FAX: [phone]         Follow up: 2 weeks or as needed    PLANTAR WARTS   Plantar warts are a viral skin infection. As with most viral infections, there is no cure, only treatment. The virus is also quite superficial, so the immune system cannot recognize it as a problem. Therefore, treatment is aimed at causing an insult that the immune system can recognize. Typically plantar warts are treated by applying liquid nitrogen, to cause a local frostbite injury, or a strong acid, to cause a blister. Sometimes medications or creams that boost immune response are prescribed.     If your treatment was with the strong acid (phenol, tri-chlor, cantharadine), you will likely develop a very tender, brown fluid-filled blister. This is normal and the blister should be allowed to break on its own and dry out. Once it is dried out, use a pumice stone to trim away the dry skin and use an over-the-counter salicylic acid product in between appointments. Call the clinic if you have any concerns regarding your blister.     The regimen between office visits should include: daily trimming with the pumice stone, application of the OTC product, and dressing with a small band-aid or piece of duct tape. You should repeat this daily until your next visit in 2-3 weeks. There is a prescription cream as well (Aldera) that can be applied between visits. This can sometimes cause surrounding skin irritation.    Duct tape is a non invasive treatment to warts. Usually requires reapplying it every night. It helps to \"choke out\" the wart. Trimming the wart down with a razor first may also help.     Again, because there is no " cure for warts, you may have 6 or more visits depending on how your wart responds. Please call the clinic if you have questions or concerns.

## 2022-04-14 NOTE — LETTER
"    4/14/2022         RE: Fer Santacruz  [address] [facility] [address]        Dear Colleague,    Thank you for referring your patient, Fer Santacruz, to the [facility]. Please see a copy of my visit note below.    Foot & Ankle Surgery  April 14, 2022    CC: \"Corn on foot\"    I was asked to see Fer Santacruz regarding the chief complaint by: Self    HPI:  Pt is a 42 year old male who presents with above complaint.  5-month history of left foot pain, including aching and throbbing.  Pain \"ranges\" from a \"1-8\" out of 10.  Painful \"half the day\".  This is worse with \"walking or pressure on\".  \"PA removed it, I have to ask with scalpel, corn strips\".      ROS:   Pos for CC.  The patient denies current nausea, vomiting, chills, fevers, belly pain, calf pain, chest pain or SOB.  Complete remainder of ROS is otherwise neg.    VITALS:  There were no vitals filed for this visit.    PMH:    Past Medical History:   Diagnosis Date     NO ACTIVE PROBLEMS        SXHX:    Past Surgical History:   Procedure Laterality Date     ARTHROSCOPY SHOULDER DISTAL CLAVICLE REPAIR Left     left shoulder - AC joint       UMBILICAL HERNIA REPAIR  2019     wisdom teeth          MEDS:    Current Outpatient Medications   Medication     albuterol (PROAIR HFA) 108 (90 Base) MCG/ACT inhaler     propranolol (INDERAL) 20 MG tablet     No current facility-administered medications for this visit.       ALL:     Allergies   Allergen Reactions     Penicillins Rash       FMH:    Family History   Problem Relation Age of Onset     Breast Cancer Mother 63     Osteoporosis Mother      Other - See Comments Father 45        wegeners granulomatosis     No Known Problems Sister      Cancer Maternal Grandmother 90        unknown type     Cancer Maternal Grandfather 90        unknown type     Lung Cancer Paternal Grandfather         smoker     Colon Cancer No family hx of      Prostate Cancer No family hx " of        SocHx:    Social History     Socioeconomic History     Marital status:      Spouse name: Gracie      Number of children: 2     Years of education: Not on file     Highest education level: Not on file   Occupational History     Occupation:       Comment: [employer]    Tobacco Use     Smoking status: Former Smoker     Packs/day: 0.50     Years: 8.00     Pack years: 4.00     Quit date: 2006     Years since quittin.2     Smokeless tobacco: Never Used   Vaping Use     Vaping Use: Not on file   Substance and Sexual Activity     Alcohol use: Yes     Alcohol/week: 21.0 standard drinks     Types: 21 Standard drinks or equivalent per week     Comment: 2-3 times weekly, 7-9 drinks per occassion     Drug use: Yes     Types: Marijuana     Comment: CBD recently     Sexual activity: Yes     Partners: Female     Comment:  - Gracie    Other Topics Concern     Parent/sibling w/ CABG, MI or angioplasty before 65F 55M? Not Asked   Social History Narrative     Not on file     Social Determinants of Health     Financial Resource Strain: Not on file   Food Insecurity: Not on file   Transportation Needs: Not on file   Physical Activity: Not on file   Stress: Not on file   Social Connections: Not on file   Intimate Partner Violence: Not on file   Housing Stability: Not on file           EXAMINATION:  Gen:   No apparent distress  Neuro:   A&Ox3, no deficits  Psych:    Answering questions appropriately for age and situation with normal affect  Head:    NCAT  Eye:    Visual scanning without deficit  Ear:    Response to auditory stimuli wnl  Lung:    Non-labored breathing on RA noted  Abd:    NTND per patient report  Lymph:    Neg for pitting/non-pitting edema BLE  Vasc:    Pulses palpable, CFT minimally delayed  Neuro:    Light touch sensation intact to all sensory nerve distributions without paresthesias  Derm:    Skin lesion plantar lateral left arch measuring 5 x 5 mm with divergent skin lines,  central peppered appearance and worse pain with lateral compression versus direct pressure consistent with a plantar wart  MSK:    ROM, strength wnl without limitation, no pain on palpation noted.  Calf:    Neg for redness, swelling or tenderness      Assessment:  42 year old male with plantar wart less than 15 individual lesions left foot      Plan:  Discussed etiologies, anatomy and options  1.  Plantar wart less than 15 individual lesions left foot  -I personally reviewed and interpreted the patient's lower extremity history pertinent to today's visit, including imaging/labs, in preparation for initiating a treatment program.  -discussed that plantar warts are very persistent, and are tough to eradicate even with aggressive treatments.  Will continue with every-2-weeks clinic treatment and daily home therapies until resolution of wart or until current plan is no longer sufficient, and will then consider Dermatology referral  -Rx for Zinc sulfate  -Rx for Mediplast    Follow up:  2 weeks or sooner with acute issues      Patient's medical history was reviewed today      Jacob Edward DPM FACFAS FACFAOM  Podiatric Foot & Ankle Surgeon  [facility]  [phone]    Disclaimer: This note consists of symbols derived from keyboarding, dictation and/or voice recognition software. As a result, there may be errors in the script that have gone undetected. Please consider this when interpreting information found in this chart.            Again, thank you for allowing me to participate in the care of your patient.        Sincerely,        Jacob Edward DPM, SHAYLEE

## 2022-04-15 ENCOUNTER — TELEPHONE (OUTPATIENT)
Dept: PODIATRY | Facility: CLINIC | Age: 42
End: 2022-04-15
Payer: COMMERCIAL

## 2022-04-15 DIAGNOSIS — B07.0 PLANTAR WART, LEFT FOOT: Primary | ICD-10-CM

## 2022-04-15 NOTE — TELEPHONE ENCOUNTER
Received message from patient's pharmacy that Salicylic acid ER does not come in a 40% solution option.    Pharmacy requesting alternative medication.  Please advise.    Nithin Barry, ATC

## 2022-04-18 NOTE — TELEPHONE ENCOUNTER
Salicylic acid 17% topical sent to patient's pharmacy as alternative.    Jacob Edward DPM [facility] FACFA  Podiatric Foot & Ankle Surgeon  [facility]  [phone]

## 2022-04-28 ENCOUNTER — OFFICE VISIT (OUTPATIENT)
Dept: PODIATRY | Facility: CLINIC | Age: 42
End: 2022-04-28
Payer: COMMERCIAL

## 2022-04-28 VITALS
SYSTOLIC BLOOD PRESSURE: 114 MMHG | WEIGHT: 198 LBS | DIASTOLIC BLOOD PRESSURE: 74 MMHG | BODY MASS INDEX: 25.41 KG/M2 | HEIGHT: 74 IN

## 2022-04-28 DIAGNOSIS — B07.0 PLANTAR WART, LEFT FOOT: Primary | ICD-10-CM

## 2022-04-28 PROCEDURE — 17110 DESTRUCTION B9 LES UP TO 14: CPT | Performed by: PODIATRIST

## 2022-04-28 NOTE — PROGRESS NOTES
Foot & Ankle Surgery   April 28, 2022    S:  Pt is seen today for evaluation of plantar wart along the plantar lateral left foot.  He just recently got the medications that we prescribed.  He states that overall is feeling better but he did scrape the area extensively recently and had some discomfort associated with this.    There were no vitals filed for this visit.'      ROS - Pos for CC.  Patient denies current nausea, vomiting, chills, fevers, belly pain, calf pain, chest pain or SOB.  Complete remainder of ROS it otherwise neg.      PE:  Gen:   No apparent distress  Eye:    Visual scanning without deficit  Ear:    Response to auditory stimuli wnl  Lung:    Non-labored breathing on RA noted  Abd:    NTND per patient report  Lymph:    Neg for pitting/non-pitting edema BLE  Vasc:    Pulses palpable, CFT minimally delayed  Neuro:    Light touch sensation intact to all sensory nerve distributions without paresthesias  Derm:   The lesion appears to be approximately 4 x 4 mm today with divergent skin lines and central peppered appearance  MSK:    ROM, strength wnl without limitation, no pain on palpation noted.  Calf:    Neg for redness, swelling or tenderness      Assessment:  42 year old male with plantar wart less than 15 individual lesions left foot      Plan:  Discussed etiologies, anatomy and options  1.  Plantar wart less than 15 individual lesions left foot  -The area was debrided with a 15 blade  -Liquid nitrogen was applied to patient tolerance  -Phenol was applied topically to patient tolerance  -Continue home oral and home topical medications    Follow up: 2 weeks or sooner with acute issues           Jacob Edward DPM FACFAS FACFAOM  Podiatric Foot & Ankle Surgeon  [facility]  [phone]    Disclaimer: This note consists of symbols derived from keyboarding, dictation and/or voice recognition software. As a result, there may be errors in the script that have gone undetected. Please  consider this when interpreting information found in this chart.

## 2022-05-02 ENCOUNTER — E-VISIT (OUTPATIENT)
Dept: FAMILY MEDICINE | Facility: CLINIC | Age: 42
End: 2022-05-02
Payer: COMMERCIAL

## 2022-05-02 DIAGNOSIS — H66.92 LEFT OTITIS MEDIA, UNSPECIFIED OTITIS MEDIA TYPE: Primary | ICD-10-CM

## 2022-05-02 PROCEDURE — 99421 OL DIG E/M SVC 5-10 MIN: CPT | Performed by: PHYSICIAN ASSISTANT

## 2022-05-02 RX ORDER — CEFDINIR 300 MG/1
300 CAPSULE ORAL 2 TIMES DAILY
Qty: 14 CAPSULE | Refills: 0 | Status: SHIPPED | OUTPATIENT
Start: 2022-05-02 | End: 2022-05-09

## 2022-05-12 ENCOUNTER — OFFICE VISIT (OUTPATIENT)
Dept: PODIATRY | Facility: CLINIC | Age: 42
End: 2022-05-12
Payer: COMMERCIAL

## 2022-05-12 VITALS — DIASTOLIC BLOOD PRESSURE: 72 MMHG | SYSTOLIC BLOOD PRESSURE: 118 MMHG

## 2022-05-12 DIAGNOSIS — B07.0 PLANTAR WART, LEFT FOOT: Primary | ICD-10-CM

## 2022-05-12 DIAGNOSIS — M72.2 PLANTAR FASCIAL FIBROMATOSIS: ICD-10-CM

## 2022-05-12 PROCEDURE — 99213 OFFICE O/P EST LOW 20 MIN: CPT | Mod: 25 | Performed by: PODIATRIST

## 2022-05-12 PROCEDURE — 17110 DESTRUCTION B9 LES UP TO 14: CPT | Performed by: PODIATRIST

## 2022-05-12 NOTE — PROGRESS NOTES
Foot & Ankle Surgery   May 12, 2022    S:  Pt is seen today for evaluation of plantar wart plantar lateral left heel.  He states that overall this is better from the initial visit but can still be intermittently bothersome.  He notices more pain after running or activities, but also indicates he has days where is not bothersome at all.  He also wanted to ask about a bump on the bottom of his left arch.  This is not specifically bothersome for him.    There were no vitals filed for this visit.'      ROS - Pos for CC.  Patient denies current nausea, vomiting, chills, fevers, belly pain, calf pain, chest pain or SOB.  Complete remainder of ROS it otherwise neg.      PE:  Gen:   No apparent distress  Eye:    Visual scanning without deficit  Ear:    Response to auditory stimuli wnl  Lung:    Non-labored breathing on RA noted  Abd:    NTND per patient report  Lymph:    Neg for pitting/non-pitting edema BLE  Vasc:    Pulses palpable, CFT minimally delayed  Neuro:    Light touch sensation intact to all sensory nerve distributions without paresthesias  Derm:    The plantar wart at the plantar lateral left heel again measures 4 x 4 mm after debridement.  There is centralized pinpoint bleeding and divergent skin lines.  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  Plantar wart left heel less than 15 individual lesions  -The lesion was debrided with a 15 blade to pinpoint bleeding  -Liquid nitrogen was applied topically to patient tolerance  -Phenol was applied topically to patient tolerance  -Continue home oral and topical medications  -We again discussed a  dermatology referral if the treatment program fails to provide relief moving forward    2.  Soft tissue mass left arch consistent with plantar fibroma  -We discussed that an MRI and a biopsy would need to be done for definitive diagnosis, but this is clinically consistent with a fibroma  -We discussed a reasonable treatment protocol, including inserts, orthotics, comfortable shoes, up to including surgical excision.  I advise he treat this as aggressively as symptoms dictate.  As it is minimally symptomatic, I advise he simply monitor for now    Follow up: 2 weeks or sooner with acute issues           Jacob Edward DPM FACFAS FACFAOM  Podiatric Foot & Ankle Surgeon  [facility]  [phone]    Disclaimer: This note consists of symbols derived from keyboarding, dictation and/or voice recognition software. As a result, there may be errors in the script that have gone undetected. Please consider this when interpreting information found in this chart.

## 2022-10-22 ENCOUNTER — HEALTH MAINTENANCE LETTER (OUTPATIENT)
Age: 42
End: 2022-10-22

## 2022-10-26 ENCOUNTER — OFFICE VISIT (OUTPATIENT)
Dept: FAMILY MEDICINE | Facility: CLINIC | Age: 42
End: 2022-10-26
Payer: COMMERCIAL

## 2022-10-26 VITALS
SYSTOLIC BLOOD PRESSURE: 134 MMHG | DIASTOLIC BLOOD PRESSURE: 89 MMHG | HEART RATE: 62 BPM | HEIGHT: 74 IN | TEMPERATURE: 97.3 F | BODY MASS INDEX: 26.05 KG/M2 | WEIGHT: 203 LBS | OXYGEN SATURATION: 97 %

## 2022-10-26 DIAGNOSIS — H92.02 EAR PAIN, LEFT: ICD-10-CM

## 2022-10-26 DIAGNOSIS — F10.20 UNCOMPLICATED ALCOHOL DEPENDENCE (H): Primary | ICD-10-CM

## 2022-10-26 DIAGNOSIS — E78.2 MIXED HYPERLIPIDEMIA: ICD-10-CM

## 2022-10-26 DIAGNOSIS — Z13.1 SCREENING FOR DIABETES MELLITUS: ICD-10-CM

## 2022-10-26 DIAGNOSIS — R51.9 DAILY HEADACHE: ICD-10-CM

## 2022-10-26 DIAGNOSIS — F41.9 ANXIETY: ICD-10-CM

## 2022-10-26 DIAGNOSIS — G47.00 INSOMNIA, UNSPECIFIED TYPE: ICD-10-CM

## 2022-10-26 DIAGNOSIS — Z12.5 SCREENING FOR PROSTATE CANCER: ICD-10-CM

## 2022-10-26 DIAGNOSIS — H53.9 VISION CHANGES: ICD-10-CM

## 2022-10-26 DIAGNOSIS — Z13.29 SCREENING FOR THYROID DISORDER: ICD-10-CM

## 2022-10-26 PROBLEM — D75.89 MACROCYTOSIS: Status: RESOLVED | Noted: 2021-09-22 | Resolved: 2022-10-26

## 2022-10-26 PROBLEM — R79.89 ELEVATED LFTS: Status: RESOLVED | Noted: 2021-09-22 | Resolved: 2022-10-26

## 2022-10-26 LAB
ALBUMIN SERPL-MCNC: 4.4 G/DL (ref 3.4–5)
ALP SERPL-CCNC: 44 U/L (ref 40–150)
ALT SERPL W P-5'-P-CCNC: 53 U/L (ref 0–70)
ANION GAP SERPL CALCULATED.3IONS-SCNC: 4 MMOL/L (ref 3–14)
AST SERPL W P-5'-P-CCNC: 39 U/L (ref 0–45)
BILIRUB SERPL-MCNC: 0.9 MG/DL (ref 0.2–1.3)
BUN SERPL-MCNC: 18 MG/DL (ref 7–30)
CALCIUM SERPL-MCNC: 9.9 MG/DL (ref 8.5–10.1)
CHLORIDE BLD-SCNC: 105 MMOL/L (ref 94–109)
CHOLEST SERPL-MCNC: 248 MG/DL
CO2 SERPL-SCNC: 30 MMOL/L (ref 20–32)
CREAT SERPL-MCNC: 1.04 MG/DL (ref 0.66–1.25)
ERYTHROCYTE [DISTWIDTH] IN BLOOD BY AUTOMATED COUNT: 12.6 % (ref 10–15)
FASTING STATUS PATIENT QL REPORTED: YES
GFR SERPL CREATININE-BSD FRML MDRD: >90 ML/MIN/1.73M2
GLUCOSE BLD-MCNC: 99 MG/DL (ref 70–99)
HCT VFR BLD AUTO: 45.8 % (ref 40–53)
HDLC SERPL-MCNC: 50 MG/DL
HGB BLD-MCNC: 15.7 G/DL (ref 13.3–17.7)
LDLC SERPL CALC-MCNC: 152 MG/DL
MCH RBC QN AUTO: 33 PG (ref 26.5–33)
MCHC RBC AUTO-ENTMCNC: 34.3 G/DL (ref 31.5–36.5)
MCV RBC AUTO: 96 FL (ref 78–100)
NONHDLC SERPL-MCNC: 198 MG/DL
PLATELET # BLD AUTO: 195 10E3/UL (ref 150–450)
POTASSIUM BLD-SCNC: 4.7 MMOL/L (ref 3.4–5.3)
PROT SERPL-MCNC: 8 G/DL (ref 6.8–8.8)
PSA SERPL-MCNC: 2.03 UG/L (ref 0–4)
RBC # BLD AUTO: 4.76 10E6/UL (ref 4.4–5.9)
SODIUM SERPL-SCNC: 139 MMOL/L (ref 133–144)
TRIGL SERPL-MCNC: 230 MG/DL
TSH SERPL DL<=0.005 MIU/L-ACNC: 2.21 MU/L (ref 0.4–4)
WBC # BLD AUTO: 4.5 10E3/UL (ref 4–11)

## 2022-10-26 PROCEDURE — G0103 PSA SCREENING: HCPCS | Performed by: PHYSICIAN ASSISTANT

## 2022-10-26 PROCEDURE — 99215 OFFICE O/P EST HI 40 MIN: CPT | Mod: 25 | Performed by: PHYSICIAN ASSISTANT

## 2022-10-26 PROCEDURE — 90471 IMMUNIZATION ADMIN: CPT | Performed by: PHYSICIAN ASSISTANT

## 2022-10-26 PROCEDURE — 90686 IIV4 VACC NO PRSV 0.5 ML IM: CPT | Performed by: PHYSICIAN ASSISTANT

## 2022-10-26 PROCEDURE — 86618 LYME DISEASE ANTIBODY: CPT | Performed by: PHYSICIAN ASSISTANT

## 2022-10-26 PROCEDURE — 0134A COVID-19,PF,MODERNA BIVALENT: CPT | Performed by: PHYSICIAN ASSISTANT

## 2022-10-26 PROCEDURE — 91313 COVID-19,PF,MODERNA BIVALENT: CPT | Performed by: PHYSICIAN ASSISTANT

## 2022-10-26 PROCEDURE — 36415 COLL VENOUS BLD VENIPUNCTURE: CPT | Performed by: PHYSICIAN ASSISTANT

## 2022-10-26 PROCEDURE — 80053 COMPREHEN METABOLIC PANEL: CPT | Performed by: PHYSICIAN ASSISTANT

## 2022-10-26 PROCEDURE — 85027 COMPLETE CBC AUTOMATED: CPT | Performed by: PHYSICIAN ASSISTANT

## 2022-10-26 PROCEDURE — 80061 LIPID PANEL: CPT | Performed by: PHYSICIAN ASSISTANT

## 2022-10-26 PROCEDURE — 84443 ASSAY THYROID STIM HORMONE: CPT | Performed by: PHYSICIAN ASSISTANT

## 2022-10-26 RX ORDER — FLUTICASONE PROPIONATE 50 MCG
2 SPRAY, SUSPENSION (ML) NASAL DAILY
Qty: 16 G | Refills: 0 | Status: SHIPPED | OUTPATIENT
Start: 2022-10-26 | End: 2022-12-06

## 2022-10-26 ASSESSMENT — ANXIETY QUESTIONNAIRES
2. NOT BEING ABLE TO STOP OR CONTROL WORRYING: NOT AT ALL
5. BEING SO RESTLESS THAT IT IS HARD TO SIT STILL: NOT AT ALL
1. FEELING NERVOUS, ANXIOUS, OR ON EDGE: NOT AT ALL
IF YOU CHECKED OFF ANY PROBLEMS ON THIS QUESTIONNAIRE, HOW DIFFICULT HAVE THESE PROBLEMS MADE IT FOR YOU TO DO YOUR WORK, TAKE CARE OF THINGS AT HOME, OR GET ALONG WITH OTHER PEOPLE: NOT DIFFICULT AT ALL
GAD7 TOTAL SCORE: 0
6. BECOMING EASILY ANNOYED OR IRRITABLE: NOT AT ALL
3. WORRYING TOO MUCH ABOUT DIFFERENT THINGS: NOT AT ALL
7. FEELING AFRAID AS IF SOMETHING AWFUL MIGHT HAPPEN: NOT AT ALL
GAD7 TOTAL SCORE: 0

## 2022-10-26 ASSESSMENT — PATIENT HEALTH QUESTIONNAIRE - PHQ9
SUM OF ALL RESPONSES TO PHQ QUESTIONS 1-9: 1
5. POOR APPETITE OR OVEREATING: NOT AT ALL

## 2022-10-26 ASSESSMENT — ENCOUNTER SYMPTOMS: EYE PAIN: 1

## 2022-10-26 NOTE — PROGRESS NOTES
"  Assessment & Plan     Uncomplicated alcohol dependence (H)  Tapering/cessation strongly encouraged.    Vision changes  Daily headache  Ear pain, left  Unclear etiology.  Comprehensive eye exam recommended.  Referral placed for [facility] and surgeons.  MRI recommended due to new/persistent eye symptoms and unrelenting left ear pain/headaches to rule out intra cranial pathology.  Lyme disease titer to rule out infective etiology.  Ear pain nonresponsive to antibiotics -persistent and daily.  We will do a trial of Flonase.  Encouraged continued sinus lavage if helpful.  - Lyme Disease Total Abs Bld with Reflex to Confirm CLIA  - Adult Eye  Referral  - MRA BRAIN ([place]) W CONTRAST  - MRA NECK (CAROTIDS) W CONTRAST  - MR Brain w/o & w Contrast  - Lyme Disease Total Abs Bld with Reflex to Confirm CLIA  - fluticasone (FLONASE) 50 MCG/ACT nasal spray  Dispense: 16 g; Refill: 0    Anxiety  Insomnia, unspecified type  Anxiety markedly improved with change of job.  Suspect still flared, albeit better, with young children/managing most of the parenting duties.  Strongly encouraged developing regimented sleep habits for both his children and himself.  This could certainly be trickling over and contributing to vision symptoms.    Mixed hyperlipidemia  Managed with diet.  Labs for surveillance  - Lipid panel reflex to direct LDL Fasting    Screening for diabetes mellitus  Routine screening  - Comprehensive metabolic panel (BMP + Alb, Alk Phos, ALT, AST, Total. Bili, TP)    Screening for thyroid disorder  Routine screening  - TSH with free T4 reflex    Screening for prostate cancer  Routine screening  - PSA, screen        52 minutes spent on the date of the encounter doing chart review, history and exam, documentation and further activities per the note       BMI:   Estimated body mass index is 26.06 kg/m  as calculated from the following:    Height as of this encounter: 1.88 m (6' 2\").    Weight as " of this encounter: 92.1 kg (203 lb).   Weight management plan: deferred - will discuss @ PX      Return in about 2 weeks (around 11/9/2022) for Physical Exam.    Meghna Pope PA-C  [facility] KIM Neumann is a 42 year old, presenting for the following health issues:  Eye Problem      Eye Problem     History of Present Illness       Reason for visit:  Annual check up, vision issues, ear pain, diarrhea, coughing sputum    He eats 4 or more servings of fruits and vegetables daily.He consumes 1 sweetened beverage(s) daily.He exercises with enough effort to increase his heart rate 30 to 60 minutes per day.  He exercises with enough effort to increase his heart rate 6 days per week.        Vision issues/left ear pain  Acute illness concerns: Vision issues  Onset/Duration: x2 months  Symptoms:  Fever: No  Chills/Sweats: No  Headache (location?): YES- come and go - more frequent  Sinus Pressure: No  Conjunctivitis:  YES- cannot focus  Ear Pain: YES- left - had e-visit -completed prescription for cefdinir -no relief- was told has fluid and will go away on own - ibuprofen / tylenol and sinus lavage does help -however, remains constant for months.  Hearing is normal.  Is having headaches almost daily because of the ear pain  Rhinorrhea: No  Congestion: YES- always has some congestion in nose  Sore Throat: No  Cough: YES-productive of clear sputum, productive of yellow-brown sputum -for years  Wheeze: No  Decreased Appetite: No  Nausea: No  Vomiting: No  Diarrhea: YES- has had for years - better last few weeks - last couple days bad again  Dysuria/Freq.: No  Dysuria or Hematuria: No  Fatigue/Achiness: No  Sick/Strep Exposure: No  Therapies tried and outcome: Ibuprofen / tylenol     For the last 2 to 3 months has noticed intermittent Depth perception issues.  Reading/driving -notices this.  Feels an occasional double vision with delayed focus.  Denies any history of head trauma.  Has never  "worn glasses/contacts.  Denies balance issues/numbness/tingling.  See above about ear pain/headaches.    Insomnia  Has young children (age 5 and 2) -they have poor sleep habits (5-year-old does not go to bed until 9/10 PM) so he finds himself staying up later to have some \"time to himself \".  Since having children he reports roughly 3-5 hours of sleep nightly -some of this is interrupted due to his children waking him.  His wife also recently completed some graduate nurse practitioner training so he has felt like a \"single dad \".  He is hoping that she will become more involved now that the program is done but he still finds himself doing a large amount of the parenting duties.    EtOH  Patient states that this is better.  He states over the summer he was drinking a lot more.  He does have a few drinks 3-4 nights weekly.  On average 15 drinks per week.  Does this mostly for social reasons but occasionally does use alcohol for stress relief.      Review of Systems   Eyes: Positive for pain.      Constitutional, HEENT, cardiovascular, pulmonary, GI, , musculoskeletal, neuro, skin, endocrine and psych systems are negative, except as otherwise noted.      Objective    /89 (BP Location: Left arm, Patient Position: Chair, Cuff Size: Adult Large)   Pulse 62   Temp 97.3  F (36.3  C) (Tympanic)   Ht 1.88 m (6' 2\")   Wt 92.1 kg (203 lb)   SpO2 97%   BMI 26.06 kg/m    Body mass index is 26.06 kg/m .  Physical Exam   GENERAL: healthy, alert and no distress  EYES: Eyes grossly normal to inspection, PERRL and conjunctivae and sclerae normal  HENT: ear canals and TM's normal, nose and mouth without ulcers or lesions  NECK: no adenopathy, no asymmetry, masses, or scars and thyroid normal to palpation  RESP: lungs clear to auscultation - no rales, rhonchi or wheezes  CV: regular rate and rhythm, normal S1 S2, no S3 or S4, no murmur, click or rub, no peripheral edema and peripheral pulses strong  MS: no gross " musculoskeletal defects noted, no edema  SKIN: no suspicious lesions or rashes  NEURO: cranial nerves II-XII grossly intact, point to point motions intact, RRM intact, able to heel toe walk, able to hop on one foot, and negative Romberg   PSYCH: mentation appears normal, affect normal/bright    Results for orders placed or performed in visit on 10/26/22 (from the past 24 hour(s))   CBC with platelets   Result Value Ref Range    WBC Count 4.5 4.0 - 11.0 10e3/uL    RBC Count 4.76 4.40 - 5.90 10e6/uL    Hemoglobin 15.7 13.3 - 17.7 g/dL    Hematocrit 45.8 40.0 - 53.0 %    MCV 96 78 - 100 fL    MCH 33.0 26.5 - 33.0 pg    MCHC 34.3 31.5 - 36.5 g/dL    RDW 12.6 10.0 - 15.0 %    Platelet Count 195 150 - 450 10e3/uL

## 2022-10-27 LAB — B BURGDOR IGG+IGM SER QL: 0.08

## 2022-10-27 NOTE — RESULT ENCOUNTER NOTE
Thien  I have reviewed your recent labs. Here are the results:    -Normal red blood cell (hgb) levels, normal white blood cell count and normal platelet levels.  -PSA (prostate specific antigen) test is normal.  This indicates a low likelihood of prostate cancer.  ADVISE: rechecking this in 1 year.  -LDL(bad) cholesterol level is elevated, and your triglycerides are elevated which can increase your heart disease risk.  This is worse than it was in September 2021.  A diet high in fat and simple carbohydrates, genetics and being overweight can contribute to this. ADVISE: exercising 150 minutes of aerobic exercise per week (30 minutes for 5 days per week or 50 minutes for 3 days per week are options), eating a low saturated fat/low carbohydrate diet, and omega-3 fatty acids (fish oil) 2451-8104 mg daily are helpful to improve this.  -Liver and gallbladder tests are normal (ALT,AST, Alk phos, bilirubin), kidney function is normal (Cr, GFR), sodium is normal, potassium is normal, calcium is normal, glucose is normal.  -TSH (thyroid stimulating hormone) level is normal which indicates normal thyroid function.  -Lyme disease test is normal    For additional lab test information, labtestsonline.org is an excellent reference.       If you have any questions please do not hesitate to contact our office via phone ([phone]) or MyChart.    Meghna Pope MBA, MS, PA-C  [facility]

## 2022-11-04 ENCOUNTER — TRANSFERRED RECORDS (OUTPATIENT)
Dept: HEALTH INFORMATION MANAGEMENT | Facility: CLINIC | Age: 42
End: 2022-11-04

## 2022-11-08 ASSESSMENT — ENCOUNTER SYMPTOMS
NERVOUS/ANXIOUS: 0
DIARRHEA: 1
ARTHRALGIAS: 0
SORE THROAT: 0
JOINT SWELLING: 0
WEAKNESS: 0
HEADACHES: 0
COUGH: 1
PALPITATIONS: 0
PARESTHESIAS: 0
HEMATOCHEZIA: 0
FREQUENCY: 0
DIZZINESS: 0
NAUSEA: 0
HEARTBURN: 0
HEMATURIA: 0
SHORTNESS OF BREATH: 0
CONSTIPATION: 0
FEVER: 0
CHILLS: 0
ABDOMINAL PAIN: 0
EYE PAIN: 0
MYALGIAS: 0
DYSURIA: 0

## 2022-11-09 ENCOUNTER — OFFICE VISIT (OUTPATIENT)
Dept: FAMILY MEDICINE | Facility: CLINIC | Age: 42
End: 2022-11-09
Payer: COMMERCIAL

## 2022-11-09 VITALS
OXYGEN SATURATION: 98 % | WEIGHT: 198.3 LBS | DIASTOLIC BLOOD PRESSURE: 76 MMHG | TEMPERATURE: 96.2 F | HEIGHT: 74 IN | BODY MASS INDEX: 25.45 KG/M2 | HEART RATE: 87 BPM | RESPIRATION RATE: 16 BRPM | SYSTOLIC BLOOD PRESSURE: 122 MMHG

## 2022-11-09 DIAGNOSIS — R09.89 THROAT CLEARING: ICD-10-CM

## 2022-11-09 DIAGNOSIS — F41.9 ANXIETY: ICD-10-CM

## 2022-11-09 DIAGNOSIS — Z00.00 ROUTINE GENERAL MEDICAL EXAMINATION AT A HEALTH CARE FACILITY: Primary | ICD-10-CM

## 2022-11-09 DIAGNOSIS — F10.20 UNCOMPLICATED ALCOHOL DEPENDENCE (H): ICD-10-CM

## 2022-11-09 DIAGNOSIS — G47.00 INSOMNIA, UNSPECIFIED TYPE: ICD-10-CM

## 2022-11-09 DIAGNOSIS — E78.2 MIXED HYPERLIPIDEMIA: ICD-10-CM

## 2022-11-09 DIAGNOSIS — H04.129 EYE DRYNESS: ICD-10-CM

## 2022-11-09 DIAGNOSIS — R19.7 DIARRHEA, UNSPECIFIED TYPE: ICD-10-CM

## 2022-11-09 DIAGNOSIS — H53.9 VISUAL DISTURBANCE: ICD-10-CM

## 2022-11-09 PROCEDURE — 99396 PREV VISIT EST AGE 40-64: CPT | Performed by: PHYSICIAN ASSISTANT

## 2022-11-09 PROCEDURE — 99214 OFFICE O/P EST MOD 30 MIN: CPT | Mod: 25 | Performed by: PHYSICIAN ASSISTANT

## 2022-11-09 RX ORDER — ZINC CITRATE/PHYTASE 25MG-500MG
1 CAPSULE ORAL 4 TIMES DAILY PRN
COMMUNITY
Start: 2022-11-09

## 2022-11-09 ASSESSMENT — ENCOUNTER SYMPTOMS
HEMATOCHEZIA: 0
COUGH: 1
SORE THROAT: 0
CHILLS: 0
JOINT SWELLING: 0
PALPITATIONS: 0
DYSURIA: 0
NERVOUS/ANXIOUS: 0
DIARRHEA: 1
HEADACHES: 0
FEVER: 0
EYE PAIN: 0
SHORTNESS OF BREATH: 0
HEMATURIA: 0
NAUSEA: 0
DIZZINESS: 0
WEAKNESS: 0
HEARTBURN: 0
ARTHRALGIAS: 0
CONSTIPATION: 0
PARESTHESIAS: 0
MYALGIAS: 0
FREQUENCY: 0
ABDOMINAL PAIN: 0

## 2022-11-09 NOTE — PROGRESS NOTES
"SUBJECTIVE:   CC: Thien is an 42 year old who presents for preventative health visit.     Patient has been advised of split billing requirements and indicates understanding: Yes  Healthy Habits:     Getting at least 3 servings of Calcium per day:  Yes    Bi-annual eye exam:  Yes    Dental care twice a year:  Yes    Sleep apnea or symptoms of sleep apnea:  None    Diet:  Regular (no restrictions)    Frequency of exercise:  4-5 days/week    Duration of exercise:  45-60 minutes    Taking medications regularly:  Yes    Medication side effects:  Not applicable    PHQ-2 Total Score: 0    Vision issues/left ear pain  Since approximately May 2022 has noticed intermittent Depth perception issues.  Reading/driving -notices this.  Feels an occasional double vision with delayed focus.  Denies any history of head trauma.  Has never worn glasses/contacts.  Denies balance issues/numbness/tingling.    Did E-visit and was treated with cefdinir without relief of ear pain (5/2/2022).  Did see eye doctor last week and did not have any ocular concerns of neurologic disease.  Was given a prescription for eyeglasses but has not yet picked these up    Insomnia  Has young children (age 5 and 2) -they have poor sleep habits (5-year-old does not go to bed until 9/10 PM) so he finds himself staying up later to have some \"time to himself \".    At her last visit on 10/26/2022 he had reported getting on average between 3 to 5 hours of sleep nightly but has been intentional over the last month and getting himself to sleep by around midnight or 1 AM which is 2 to 3 hours sooner than he was getting to bed previously..  His wife also recently completed some graduate nurse practitioner training so he has felt like a \"single dad \" -at his last visit we did discuss having a jeovany conversation to discuss parental expectations as a  couple.     Cough/throat clearing  With some sputum.  For at least the last few months.  Does not seem to predominate " during a certain time of the day.  Denies reflux symptoms.    Diarrhea  Chronic for at least the last 3 years.  Was having upwards of 15 bowel movements daily.  No abdominal pain.  No history in his family of GI concerns.  Feels that his bowel movements have become more formed in the last few weeks.  Does correlate this with improvement in sleeping habits.  Is still having 3-4 bowel movements daily but more formed.    EtOH   Patient states that this is better.  He states over the summer he was drinking a lot more.  He does have a few drinks 3-4 nights weekly.  On average 15 drinks per week.  Does this mostly for social reasons but occasionally does use alcohol for stress relief.    Today's PHQ-2 Score:   PHQ-2 (  [employer]) 2022   Q1: Little interest or pleasure in doing things 0   Q2: Feeling down, depressed or hopeless 0   PHQ-2 Score 0   PHQ-2 Total Score (12-17 Years)- Positive if 3 or more points; Administer PHQ-A if positive -   Q1: Little interest or pleasure in doing things Not at all   Q2: Feeling down, depressed or hopeless Not at all   PHQ-2 Score 0       Abuse: Current or Past(Physical, Sexual or Emotional)- No  Do you feel safe in your environment? Yes        Social History     Tobacco Use     Smoking status: Former     Packs/day: 0.50     Years: 8.00     Pack years: 4.00     Types: Cigarettes     Quit date: 2006     Years since quittin.8     Smokeless tobacco: Never   Substance Use Topics     Alcohol use: Yes     Alcohol/week: 15.0 - 20.0 standard drinks     Types: 15 - 20 Standard drinks or equivalent per week     Comment: 2-3 times weekly, 7-9 drinks per occassion     If you drink alcohol do you typically have >3 drinks per day or >7 drinks per week? Yes      AUDIT - Alcohol Use Disorders Identification Test - Reproduced from the World Health Organization Audit 2001 (Second Edition) 2022   1.  How often do you have a drink containing alcohol? 2 to 3 times a week   2.  How many  drinks containing alcohol do you have on a typical day when you are drinking? 5 or 6   3.  How often do you have five or more drinks on one occasion? Weekly   4.  How often during the last year have you found that you were not able to stop drinking once you had started? Monthly   5.  How often during the last year have you failed to do what was normally expected of you because of drinking? Never   6.  How often during the last year have you needed a first drink in the morning to get yourself going after a heavy drinking session? Never   7.  How often during the last year have you had a feeling of guilt or remorse after drinking? Less than monthly   8.  How often during the last year have you been unable to remember what happened the night before because of your drinking? Never   9.  Have you or someone else been injured because of your drinking? No   10. Has a relative, friend, doctor or other health care worker been concerned about your drinking or suggested you cut down? Yes, but not in the last year   TOTAL SCORE 13       Last PSA:   Prostate Specific Antigen Screen   Date Value Ref Range Status   10/26/2022 2.03 0.00 - 4.00 ug/L Final       Reviewed orders with patient. Reviewed health maintenance and updated orders accordingly - Yes  BP Readings from Last 3 Encounters:   11/09/22 122/76   10/26/22 134/89   05/12/22 118/72    Wt Readings from Last 3 Encounters:   11/09/22 89.9 kg (198 lb 4.8 oz)   10/26/22 92.1 kg (203 lb)   04/28/22 89.8 kg (198 lb)                  Patient Active Problem List   Diagnosis     Alcohol dependence (H)     Anxiety     Insomnia, unspecified type     Mixed hyperlipidemia     Visual disturbance     Eye dryness     Diarrhea, unspecified type     Past Surgical History:   Procedure Laterality Date     ARTHROSCOPY SHOULDER DISTAL CLAVICLE REPAIR Left     left shoulder - AC joint       UMBILICAL HERNIA REPAIR  2019     wisdom teeth         Social History     Tobacco Use     Smoking status:  Former     Packs/day: 0.50     Years: 8.00     Pack years: 4.00     Types: Cigarettes     Quit date: 2006     Years since quittin.8     Smokeless tobacco: Never   Substance Use Topics     Alcohol use: Yes     Alcohol/week: 15.0 - 20.0 standard drinks     Types: 15 - 20 Standard drinks or equivalent per week     Comment: 2-3 times weekly, 7-9 drinks per occassion     Family History   Problem Relation Age of Onset     Breast Cancer Mother 63     Osteoporosis Mother      Other - See Comments Father 45        wegeners granulomatosis     No Known Problems Sister      Cancer Maternal Grandmother 90        unknown type     Cancer Maternal Grandfather 90        unknown type     Lung Cancer Paternal Grandfather         smoker     Colon Cancer No family hx of      Prostate Cancer No family hx of          Current Outpatient Medications   Medication Sig Dispense Refill     fluticasone (FLONASE) 50 MCG/ACT nasal spray Spray 2 sprays into both nostrils daily 16 g 0     omeprazole (PRILOSEC) 20 MG DR capsule Take 1 capsule (20 mg) by mouth 2 times daily (before meals) for 14 days       RETAINE MGD 0.5-0.5 % EMUL Apply 1 drop to eye 4 times daily as needed (dry eyes)         Reviewed and updated as needed this visit by clinical staff   Tobacco  Allergies  Meds  Problems  Med Hx  Surg Hx  Fam Hx  Soc   Hx        Reviewed and updated as needed this visit by Provider   Tobacco  Allergies  Meds  Problems  Med Hx  Surg Hx  Fam Hx  Soc   Hx           Review of Systems   Constitutional: Negative for chills and fever.   HENT: Positive for congestion and ear pain. Negative for hearing loss and sore throat.    Eyes: Positive for visual disturbance. Negative for pain.   Respiratory: Positive for cough. Negative for shortness of breath.    Cardiovascular: Negative for chest pain, palpitations and peripheral edema.   Gastrointestinal: Positive for diarrhea. Negative for abdominal pain, constipation, heartburn,  "hematochezia and nausea.   Genitourinary: Negative for dysuria, frequency, genital sores, hematuria, impotence, penile discharge and urgency.   Musculoskeletal: Negative for arthralgias, joint swelling and myalgias.   Skin: Negative for rash.   Neurological: Negative for dizziness, weakness, headaches and paresthesias.   Psychiatric/Behavioral: Negative for mood changes. The patient is not nervous/anxious.          OBJECTIVE:   /76   Pulse 87   Temp (!) 96.2  F (35.7  C) (Tympanic)   Resp 16   Ht 1.88 m (6' 2\")   Wt 89.9 kg (198 lb 4.8 oz)   SpO2 98%   BMI 25.46 kg/m      Physical Exam  GENERAL: healthy, alert and no distress  EYES: Eyes grossly normal to inspection, PERRL and conjunctivae and sclerae normal  HENT: ear canals and TM's normal, nose and mouth without ulcers or lesions  NECK: no adenopathy, no asymmetry, masses, or scars and thyroid normal to palpation  RESP: lungs clear to auscultation - no rales, rhonchi or wheezes  CV: regular rate and rhythm, normal S1 S2, no S3 or S4, no murmur, click or rub, no peripheral edema and peripheral pulses strong  ABDOMEN: soft, nontender, no hepatosplenomegaly, no masses and bowel sounds normal  MS: no gross musculoskeletal defects noted, no edema  SKIN: no suspicious lesions or rashes  NEURO: Normal strength and tone, mentation intact and speech normal  PSYCH: mentation appears normal, affect normal/bright    Diagnostic Test Results:  Office Visit on 10/26/2022   Component Date Value Ref Range Status     Cholesterol 10/26/2022 248 (H)  <200 mg/dL Final     Triglycerides 10/26/2022 230 (H)  <150 mg/dL Final     Direct Measure HDL 10/26/2022 50  >=40 mg/dL Final     LDL Cholesterol Calculated 10/26/2022 152 (H)  <=100 mg/dL Final     Non HDL Cholesterol 10/26/2022 198 (H)  <130 mg/dL Final     Patient Fasting > 8hrs? 10/26/2022 Yes   Final     Sodium 10/26/2022 139  133 - 144 mmol/L Final     Potassium 10/26/2022 4.7  3.4 - 5.3 mmol/L Final     Chloride " 10/26/2022 105  94 - 109 mmol/L Final     Carbon Dioxide (CO2) 10/26/2022 30  20 - 32 mmol/L Final     Anion Gap 10/26/2022 4  3 - 14 mmol/L Final     Urea Nitrogen 10/26/2022 18  7 - 30 mg/dL Final     Creatinine 10/26/2022 1.04  0.66 - 1.25 mg/dL Final     Calcium 10/26/2022 9.9  8.5 - 10.1 mg/dL Final     Glucose 10/26/2022 99  70 - 99 mg/dL Final     Alkaline Phosphatase 10/26/2022 44  40 - 150 U/L Final     AST 10/26/2022 39  0 - 45 U/L Final     ALT 10/26/2022 53  0 - 70 U/L Final     Protein Total 10/26/2022 8.0  6.8 - 8.8 g/dL Final     Albumin 10/26/2022 4.4  3.4 - 5.0 g/dL Final     Bilirubin Total 10/26/2022 0.9  0.2 - 1.3 mg/dL Final     GFR Estimate 10/26/2022 >90  >60 mL/min/1.73m2 Final    Effective December 21, 2021 eGFRcr in adults is calculated using the 2021 CKD-EPI creatinine equation which includes age and gender (Pinky et al., [place], DOI: 10.1056/AQEGfz5661116)     WBC Count 10/26/2022 4.5  4.0 - 11.0 10e3/uL Final     RBC Count 10/26/2022 4.76  4.40 - 5.90 10e6/uL Final     Hemoglobin 10/26/2022 15.7  13.3 - 17.7 g/dL Final     Hematocrit 10/26/2022 45.8  40.0 - 53.0 % Final     MCV 10/26/2022 96  78 - 100 fL Final     MCH 10/26/2022 33.0  26.5 - 33.0 pg Final     MCHC 10/26/2022 34.3  31.5 - 36.5 g/dL Final     RDW 10/26/2022 12.6  10.0 - 15.0 % Final     Platelet Count 10/26/2022 195  150 - 450 10e3/uL Final     Prostate Specific Antigen Screen 10/26/2022 2.03  0.00 - 4.00 ug/L Final     TSH 10/26/2022 2.21  0.40 - 4.00 mU/L Final     Lyme Disease Antibodies Total 10/26/2022 0.08  <0.90 Final    Non-reactive, Absence of detectable Borrelia burgdorferi antibodies. A non-reactive result does not exclude the possibility of Borrelia burgdorferi infection. If early Lyme disease is suspected, a second sample should be collected and tested 2 to 4 weeks later.         ASSESSMENT/PLAN:   Fer was seen today for physical.    Diagnoses and all orders for this visit:    Routine general medical  examination at a health care facility    Uncomplicated alcohol dependence (H)  Mixed hyperlipidemia  Suspect alcohol and diet could be contributing.  Encouraged ultra prevention Book by Dr. Henry Vieyra.  Patient avoiding fish oil due to possible increase in prostate cancer risk.  Encouraged flax oil/William seeds/healthy nuts/healthy oils.  Repeat cholesterol in 6 months with fasting lab only appointment recommended.  -     Lipid panel reflex to direct LDL Fasting; Future    Anxiety  Insomnia, unspecified type  Improving.  Continue to work on more scheduled sleep as this likely is impacting vision.  Encouraged increasing communication with spouse.  Consider marriage therapy.  Self-care encouraged.    Eye dryness  Vision distortion  Encouraged patient to obtain glasses as soon as possible to see at this resolves his symptoms entirely.  Encouraged working on sleep hygiene.  If no significant improvement after consistent wearing of glasses for 2 weeks then move forward with MRIs to evaluate for intracranial pathology.  Trial of retain eyedrops due to dryness.  -     RETAINE MGD 0.5-0.5 % EMUL; Apply 1 drop to eye 4 times daily as needed (dry eyes)    Throat clearing  Unclear etiology.  Persisting despite antibiotics in May 2022.  Recommend trial of omeprazole twice daily x14 days.  Patient encouraged to send message in approximately 2 weeks on symptom update.  -     omeprazole (PRILOSEC) 20 MG DR capsule; Take 1 capsule (20 mg) by mouth 2 times daily (before meals) for 14 days    Diarrhea  Chronic.  Unclear etiology.  Suspect significantly impacted by stress/mood.  Continue to work on self-care, fiber intake, minimize processed foods.    Patient has been advised of split billing requirements and indicates understanding: Yes      COUNSELING:   Reviewed preventive health counseling, as reflected in patient instructions       Regular exercise       Healthy diet/nutrition    Estimated body mass index is 25.46 kg/m  as  "calculated from the following:    Height as of this encounter: 1.88 m (6' 2\").    Weight as of this encounter: 89.9 kg (198 lb 4.8 oz).     Weight management plan: Discussed healthy diet and exercise guidelines    He reports that he quit smoking about 16 years ago. His smoking use included cigarettes. He has a 4.00 pack-year smoking history. He has never used smokeless tobacco.      Counseling Resources:  ATP IV Guidelines  Pooled Cohorts Equation Calculator  FRAX Risk Assessment  ICSI Preventive Guidelines  Dietary Guidelines for Americans, 2010  USDA's MyPlate  ASA Prophylaxis  Lung CA Screening    Meghna Pope PA-C  [facility]  "

## 2022-11-09 NOTE — PATIENT INSTRUCTIONS
To help lower your cholesterol and triglycerides:     Try odorless fish oil or flax seed oil capsules - 3000-4000mg by   mouth  daily ( if your cholesterol is already normal then 2000mg/day),   an odor-free garlic supplement daily,  coenzyme q-10 @ 100mg by   mouth daily, red yeast rice extract 1200mg by mouth twice daily and   plant sterols such as Benecol : Three servings per day (1.5 g sitostanol   per 1 1/2 teaspoon [8 g] serving) or  Minute Maid  Heart Silverman  orange   juice or  Rice Dream Heart Silverman  beverages: Two 8 fluid ounce   servings daily.     Can also do a daily fiber therapy with citrucel or rosana fiber   (available at [facility]) to help reduce triglycerides and overall cholesterol.    Decreased to 1/2 or even 1/4 over the counter usual dose, if getting diarrhea/gas/bloating.     Or for  daily antioxidant/fiber therapy instead of citrucel or rosana fiber :   drink florencio fresca - 1 scoop of florencio seeds (one of natures superfoods -   available at [facility]) in 8 oz water with juice of 1/4-1/2 of a lime and     A little sweetener ( sugar) or Agave syrup or honey to taste. It makes   a slurry w/ the seeds. You chew the seeds a bit as you drink it .   Tastes a bit like a sweet -tart.     Preventive Health Recommendations  Male Ages 40 to 49    Yearly exam:             See your health care provider every year in order to  o   Review health changes.   o   Discuss preventive care.    o   Review your medicines if your doctor has prescribed any.  You should be tested each year for STDs (sexually transmitted diseases) if you re at risk.   Have a cholesterol test every 5 years.   Have a colonoscopy (test for colon cancer) if someone in your family has had colon cancer or polyps before age 50.   After age 45, have a diabetes test (fasting glucose). If you are at risk for diabetes, you should have this test every 3 years.    Talk with your health care provider about whether or not a prostate cancer screening test (PSA) is right  for you.    Shots: Get a flu shot each year. Get a tetanus shot every 10 years.     Nutrition:  Eat at least 5 servings of fruits and vegetables daily.   Eat whole-grain bread, whole-wheat pasta and brown rice instead of white grains and rice.   Get adequate Calcium and Vitamin D.     Lifestyle  Exercise for at least 150 minutes a week (30 minutes a day, 5 days a week). This will help you control your weight and prevent disease.   Limit alcohol to one drink per day.   No smoking.   Wear sunscreen to prevent skin cancer.   See your dentist every six months for an exam and cleaning.

## 2024-01-14 ENCOUNTER — HEALTH MAINTENANCE LETTER (OUTPATIENT)
Age: 44
End: 2024-01-14

## 2025-01-03 ENCOUNTER — ANCILLARY PROCEDURE (OUTPATIENT)
Dept: GENERAL RADIOLOGY | Facility: CLINIC | Age: 45
End: 2025-01-03
Attending: FAMILY MEDICINE
Payer: COMMERCIAL

## 2025-01-03 DIAGNOSIS — J20.9 ACUTE BRONCHITIS, UNSPECIFIED ORGANISM: ICD-10-CM

## 2025-01-03 PROCEDURE — 71046 X-RAY EXAM CHEST 2 VIEWS: CPT | Mod: TC | Performed by: INTERNAL MEDICINE

## 2025-01-26 ENCOUNTER — HEALTH MAINTENANCE LETTER (OUTPATIENT)
Age: 45
End: 2025-01-26

## 2025-04-18 ENCOUNTER — ANCILLARY PROCEDURE (OUTPATIENT)
Dept: GENERAL RADIOLOGY | Facility: CLINIC | Age: 45
End: 2025-04-18
Attending: FAMILY MEDICINE
Payer: COMMERCIAL

## 2025-04-18 DIAGNOSIS — R07.81 RIB PAIN ON RIGHT SIDE: ICD-10-CM

## 2025-04-18 PROCEDURE — 71101 X-RAY EXAM UNILAT RIBS/CHEST: CPT | Mod: TC | Performed by: RADIOLOGY
